# Patient Record
Sex: FEMALE | Race: WHITE | NOT HISPANIC OR LATINO | URBAN - METROPOLITAN AREA
[De-identification: names, ages, dates, MRNs, and addresses within clinical notes are randomized per-mention and may not be internally consistent; named-entity substitution may affect disease eponyms.]

---

## 2019-07-18 ENCOUNTER — OUTPATIENT (OUTPATIENT)
Dept: OUTPATIENT SERVICES | Facility: HOSPITAL | Age: 84
LOS: 1 days | End: 2019-07-18
Payer: MEDICARE

## 2019-07-18 VITALS
SYSTOLIC BLOOD PRESSURE: 171 MMHG | OXYGEN SATURATION: 96 % | HEART RATE: 79 BPM | TEMPERATURE: 98 F | RESPIRATION RATE: 16 BRPM | DIASTOLIC BLOOD PRESSURE: 75 MMHG | HEIGHT: 65 IN | WEIGHT: 212.97 LBS

## 2019-07-18 VITALS
OXYGEN SATURATION: 95 % | DIASTOLIC BLOOD PRESSURE: 71 MMHG | RESPIRATION RATE: 20 BRPM | SYSTOLIC BLOOD PRESSURE: 128 MMHG | HEART RATE: 70 BPM

## 2019-07-18 DIAGNOSIS — H25.812 COMBINED FORMS OF AGE-RELATED CATARACT, LEFT EYE: ICD-10-CM

## 2019-07-18 DIAGNOSIS — Z90.710 ACQUIRED ABSENCE OF BOTH CERVIX AND UTERUS: Chronic | ICD-10-CM

## 2019-07-18 DIAGNOSIS — Z98.890 OTHER SPECIFIED POSTPROCEDURAL STATES: Chronic | ICD-10-CM

## 2019-07-18 PROCEDURE — V2632: CPT

## 2019-07-18 PROCEDURE — 66984 XCAPSL CTRC RMVL W/O ECP: CPT | Mod: LT

## 2019-07-18 NOTE — ASU DISCHARGE PLAN (ADULT/PEDIATRIC) - PATIENT EDUCATION MATERIALS PROVIED
Intraocular lens implant(IOL), Eye shield with instructions , sunglasses and eye kit given to patient./Implant card (specify)/Other (specify)

## 2019-07-18 NOTE — ASU DISCHARGE PLAN (ADULT/PEDIATRIC) - ASU DC SPECIAL INSTRUCTIONSFT
You are permitted to remove the hard shield anytime after 5pm and discard the soft dressing and tape. Replace the hard shield and secure with a piece of tape. Your vision will be blurry in this eye today. Do not take drops in this eye unless otherwise instructed. Continue any drops as usual in the other eye. Wear the eye shield at bedtime and while napping. Bring the eye kit and all of your other eye medications to the office tomorrow. You may experience some itching or scratchiness following surgery. This is normal and is usually relieved with Tylenol which can be taken 650mg by mouth every 4-6 hours for pain. Avoid Aspirin or Motrin. If you experience persistent decrease in vision, pain, excessive bleeding , temperature above 101, persistent nausea or vomiting contact your surgeon at [phone].

## 2019-07-18 NOTE — ASU DISCHARGE PLAN (ADULT/PEDIATRIC) - DRIVING
Baby was not in room when LC arrived, parents state baby is NBN but should be coming back after feeding in the NBN, mother states pumping has been going well but is concerned she has not gotten any milk yet when she pumps, discussed supply and demand in regards to milk supply, assured her it is normal not to get milk when first beginning to pump, verified understanding of proper pump use and settings, mother denies pain when she pumps, states she has been pumping comfortably at a suction of 30%, encouraged to be sure to set suction for comfort, encouraged to decrease speed from 80-60 after 2 minutes    Plan:  Q 3 hours may attempt to breastfeed for 5-10 minutes maximum  Pump for 15 minutes  Supplement per MD orders    Mother has a Medela pump for home use, encouraged rental of a HG pump until breastfeeding/milk supply better established, pump rental information provided    Encouraged to call for assistance as needed   No...

## 2022-01-27 NOTE — ASU DISCHARGE PLAN (ADULT/PEDIATRIC) - CARE PROVIDER_API CALL
[General Appearance - Well Developed] : well developed [Normal Appearance] : normal appearance [Well Groomed] : well groomed [General Appearance - Well Nourished] : well nourished [No Deformities] : no deformities [General Appearance - In No Acute Distress] : no acute distress [Normal Conjunctiva] : the conjunctiva exhibited no abnormalities [Eyelids - No Xanthelasma] : the eyelids demonstrated no xanthelasmas [Normal Oral Mucosa] : normal oral mucosa [No Oral Pallor] : no oral pallor [No Oral Cyanosis] : no oral cyanosis [Normal Jugular Venous A Waves Present] : normal jugular venous A waves present [Normal Jugular Venous V Waves Present] : normal jugular venous V waves present [No Jugular Venous Myers A Waves] : no jugular venous myers A waves [Heart Rate And Rhythm] : heart rate and rhythm were normal [Heart Sounds] : normal S1 and S2 [Systolic grade ___/6] : A grade [unfilled]/6 systolic murmur was heard. [Respiration, Rhythm And Depth] : normal respiratory rhythm and effort [Exaggerated Use Of Accessory Muscles For Inspiration] : no accessory muscle use [Auscultation Breath Sounds / Voice Sounds] : lungs were clear to auscultation bilaterally [Bowel Sounds] : normal bowel sounds [Abdomen Soft] : soft [Abdomen Tenderness] : non-tender [Abdomen Mass (___ Cm)] : no abdominal mass palpated [Abnormal Walk] : normal gait [Gait - Sufficient For Exercise Testing] : the gait was sufficient for exercise testing [Nail Clubbing] : no clubbing of the fingernails [Cyanosis, Localized] : no localized cyanosis [Petechial Hemorrhages (___cm)] : no petechial hemorrhages [Skin Color & Pigmentation] : normal skin color and pigmentation [] : no rash [No Venous Stasis] : no venous stasis [Skin Lesions] : no skin lesions [No Skin Ulcers] : no skin ulcer [No Xanthoma] : no  xanthoma was observed [Oriented To Time, Place, And Person] : oriented to person, place, and time Tennille Bernardo)  Ophthalmology  [address], [address]  [place] [address]  Phone: [phone]  Fax: [phone]  Follow Up Time:

## 2023-01-25 ENCOUNTER — RX ONLY (RX ONLY)
Age: 88
End: 2023-01-25

## 2023-01-25 ENCOUNTER — OFFICE (OUTPATIENT)
Dept: URBAN - METROPOLITAN AREA CLINIC 109 | Facility: CLINIC | Age: 88
Setting detail: OPHTHALMOLOGY
End: 2023-01-25
Payer: MEDICARE

## 2023-01-25 DIAGNOSIS — H18.503: ICD-10-CM

## 2023-01-25 DIAGNOSIS — H40.013: ICD-10-CM

## 2023-01-25 DIAGNOSIS — H52.4: ICD-10-CM

## 2023-01-25 PROCEDURE — 92202 OPSCPY EXTND ON/MAC DRAW: CPT | Performed by: OPHTHALMOLOGY

## 2023-01-25 PROCEDURE — 92015 DETERMINE REFRACTIVE STATE: CPT | Performed by: OPHTHALMOLOGY

## 2023-01-25 PROCEDURE — 92025 CPTRIZED CORNEAL TOPOGRAPHY: CPT | Performed by: OPHTHALMOLOGY

## 2023-01-25 PROCEDURE — 92004 COMPRE OPH EXAM NEW PT 1/>: CPT | Performed by: OPHTHALMOLOGY

## 2023-01-25 ASSESSMENT — AXIALLENGTH_DERIVED
OD_AL: 23.0579
OD_AL: 25.53
OS_AL: 26.08
OS_AL: 23.316
OS_AL: 23.8
OD_AL: 23.0115

## 2023-01-25 ASSESSMENT — REFRACTION_MANIFEST
OS_ADD: +2.75
OD_CYLINDER: -3.00
OD_CYLINDER: -2.75
OD_VA1: 20/30
OS_AXIS: 95
OS_SPHERE: +2.00
OS_AXIS: 100
OD_SPHERE: +1.50
OS_CYLINDER: -3.50
OS_SPHERE: -4.50
OD_VA1: 20/20
OD_ADD: +2.75
OD_AXIS: 85
OS_VA1: 20/40
OS_CYLINDER: -3.50
OS_VA1: 20/20
OD_AXIS: 86
OD_SPHERE: -4.75
OS_ADD: +2.75
OD_ADD: +2.75

## 2023-01-25 ASSESSMENT — REFRACTION_CURRENTRX
OS_AXIS: 17
OS_ADD: +3.00
OD_OVR_VA: 20/
OD_CYLINDER: -1.75
OD_ADD: +3.00
OS_CYLINDER: -2.00
OS_OVR_VA: 20/
OD_SPHERE: -0.25
OD_AXIS: 86
OS_SPHERE: -0.75

## 2023-01-25 ASSESSMENT — PACHYMETRY
OD_CT_CORRECTION: -6
OD_CT_UM: 634
OS_CT_CORRECTION: -6
OS_CT_UM: 621

## 2023-01-25 ASSESSMENT — SPHEQUIV_DERIVED
OD_SPHEQUIV: 0
OS_SPHEQUIV: -1
OD_SPHEQUIV: -6.125
OD_SPHEQUIV: -0.125
OS_SPHEQUIV: -6.25
OS_SPHEQUIV: 0.25

## 2023-01-25 ASSESSMENT — CONFRONTATIONAL VISUAL FIELD TEST (CVF)
OS_FINDINGS: FULL
OD_FINDINGS: FULL

## 2023-01-25 ASSESSMENT — REFRACTION_AUTOREFRACTION
OS_CYLINDER: -4.00
OS_AXIS: 96
OS_SPHERE: +1.00
OD_CYLINDER: -3.25
OD_AXIS: 82
OD_SPHERE: +1.50

## 2023-01-25 ASSESSMENT — KERATOMETRY
OS_K2POWER_DIOPTERS: 44.87
OD_K2POWER_DIOPTERS: 45.87
OD_AXISANGLE_DEGREES: 167
OS_K1POWER_DIOPTERS: 43.12
OD_K1POWER_DIOPTERS: 44.37
OS_AXISANGLE_DEGREES: 173

## 2023-01-25 ASSESSMENT — VISUAL ACUITY
OS_BCVA: 20/20-1
OD_BCVA: 20/70

## 2023-01-25 ASSESSMENT — TONOMETRY
OS_IOP_MMHG: 16
OD_IOP_MMHG: 16

## 2023-01-25 ASSESSMENT — LID POSITION - PTOSIS
OS_PTOSIS: LUL 1+
OD_PTOSIS: RUL 1+

## 2023-03-06 ENCOUNTER — INPATIENT (INPATIENT)
Facility: HOSPITAL | Age: 88
LOS: 1 days | Discharge: HOME | DRG: 305 | End: 2023-03-08
Attending: INTERNAL MEDICINE | Admitting: INTERNAL MEDICINE
Payer: MEDICARE

## 2023-03-06 ENCOUNTER — TRANSCRIPTION ENCOUNTER (OUTPATIENT)
Age: 88
End: 2023-03-06

## 2023-03-06 VITALS
OXYGEN SATURATION: 97 % | HEART RATE: 93 BPM | DIASTOLIC BLOOD PRESSURE: 85 MMHG | RESPIRATION RATE: 18 BRPM | WEIGHT: 179.02 LBS | TEMPERATURE: 98 F | SYSTOLIC BLOOD PRESSURE: 149 MMHG | HEIGHT: 66 IN

## 2023-03-06 DIAGNOSIS — Z90.710 ACQUIRED ABSENCE OF BOTH CERVIX AND UTERUS: Chronic | ICD-10-CM

## 2023-03-06 DIAGNOSIS — Z98.890 OTHER SPECIFIED POSTPROCEDURAL STATES: Chronic | ICD-10-CM

## 2023-03-06 DIAGNOSIS — R03.0 ELEVATED BLOOD-PRESSURE READING, WITHOUT DIAGNOSIS OF HYPERTENSION: ICD-10-CM

## 2023-03-06 PROBLEM — Z00.00 ENCOUNTER FOR PREVENTIVE HEALTH EXAMINATION: Status: ACTIVE | Noted: 2023-03-06

## 2023-03-06 PROBLEM — E03.9 HYPOTHYROIDISM, UNSPECIFIED: Chronic | Status: ACTIVE | Noted: 2019-07-18

## 2023-03-06 PROBLEM — I48.91 UNSPECIFIED ATRIAL FIBRILLATION: Chronic | Status: ACTIVE | Noted: 2019-07-18

## 2023-03-06 LAB
ALBUMIN SERPL ELPH-MCNC: 3.8 G/DL — SIGNIFICANT CHANGE UP (ref 3.3–5)
ALP SERPL-CCNC: 73 U/L — SIGNIFICANT CHANGE UP (ref 40–120)
ALT FLD-CCNC: 18 U/L — SIGNIFICANT CHANGE UP (ref 12–78)
ANION GAP SERPL CALC-SCNC: 6 MMOL/L — SIGNIFICANT CHANGE UP (ref 5–17)
APTT BLD: 48.3 SEC — HIGH (ref 27.5–35.5)
AST SERPL-CCNC: 18 U/L — SIGNIFICANT CHANGE UP (ref 15–37)
BASOPHILS # BLD AUTO: 0.1 K/UL — SIGNIFICANT CHANGE UP (ref 0–0.2)
BASOPHILS NFR BLD AUTO: 1.2 % — SIGNIFICANT CHANGE UP (ref 0–2)
BILIRUB SERPL-MCNC: 1.1 MG/DL — SIGNIFICANT CHANGE UP (ref 0.2–1.2)
BUN SERPL-MCNC: 12 MG/DL — SIGNIFICANT CHANGE UP (ref 7–23)
CALCIUM SERPL-MCNC: 9.3 MG/DL — SIGNIFICANT CHANGE UP (ref 8.5–10.1)
CHLORIDE SERPL-SCNC: 105 MMOL/L — SIGNIFICANT CHANGE UP (ref 96–108)
CK SERPL-CCNC: 44 U/L — SIGNIFICANT CHANGE UP (ref 26–192)
CO2 SERPL-SCNC: 25 MMOL/L — SIGNIFICANT CHANGE UP (ref 22–31)
CREAT SERPL-MCNC: 0.89 MG/DL — SIGNIFICANT CHANGE UP (ref 0.5–1.3)
EGFR: 63 ML/MIN/1.73M2 — SIGNIFICANT CHANGE UP
EOSINOPHIL # BLD AUTO: 0.58 K/UL — HIGH (ref 0–0.5)
EOSINOPHIL NFR BLD AUTO: 6.9 % — HIGH (ref 0–6)
FLUAV AG NPH QL: SIGNIFICANT CHANGE UP
FLUBV AG NPH QL: SIGNIFICANT CHANGE UP
GLUCOSE SERPL-MCNC: 114 MG/DL — HIGH (ref 70–99)
HCT VFR BLD CALC: 52.5 % — HIGH (ref 34.5–45)
HGB BLD-MCNC: 16.8 G/DL — HIGH (ref 11.5–15.5)
IMM GRANULOCYTES NFR BLD AUTO: 0.2 % — SIGNIFICANT CHANGE UP (ref 0–0.9)
INR BLD: 2.95 RATIO — HIGH (ref 0.88–1.16)
LYMPHOCYTES # BLD AUTO: 1.15 K/UL — SIGNIFICANT CHANGE UP (ref 1–3.3)
LYMPHOCYTES # BLD AUTO: 13.7 % — SIGNIFICANT CHANGE UP (ref 13–44)
MCHC RBC-ENTMCNC: 23.7 PG — LOW (ref 27–34)
MCHC RBC-ENTMCNC: 32 GM/DL — SIGNIFICANT CHANGE UP (ref 32–36)
MCV RBC AUTO: 73.9 FL — LOW (ref 80–100)
MONOCYTES # BLD AUTO: 0.82 K/UL — SIGNIFICANT CHANGE UP (ref 0–0.9)
MONOCYTES NFR BLD AUTO: 9.8 % — SIGNIFICANT CHANGE UP (ref 2–14)
NEUTROPHILS # BLD AUTO: 5.72 K/UL — SIGNIFICANT CHANGE UP (ref 1.8–7.4)
NEUTROPHILS NFR BLD AUTO: 68.2 % — SIGNIFICANT CHANGE UP (ref 43–77)
NRBC # BLD: 0 /100 WBCS — SIGNIFICANT CHANGE UP (ref 0–0)
PLATELET # BLD AUTO: 654 K/UL — HIGH (ref 150–400)
POTASSIUM SERPL-MCNC: 4 MMOL/L — SIGNIFICANT CHANGE UP (ref 3.5–5.3)
POTASSIUM SERPL-SCNC: 4 MMOL/L — SIGNIFICANT CHANGE UP (ref 3.5–5.3)
PROT SERPL-MCNC: 6.8 G/DL — SIGNIFICANT CHANGE UP (ref 6–8.3)
PROTHROM AB SERPL-ACNC: 34.9 SEC — HIGH (ref 10.5–13.4)
RBC # BLD: 7.1 M/UL — HIGH (ref 3.8–5.2)
RBC # FLD: 19.5 % — HIGH (ref 10.3–14.5)
RSV RNA NPH QL NAA+NON-PROBE: SIGNIFICANT CHANGE UP
SARS-COV-2 RNA SPEC QL NAA+PROBE: SIGNIFICANT CHANGE UP
SODIUM SERPL-SCNC: 136 MMOL/L — SIGNIFICANT CHANGE UP (ref 135–145)
TROPONIN I, HIGH SENSITIVITY RESULT: 12.3 NG/L — SIGNIFICANT CHANGE UP
TROPONIN I, HIGH SENSITIVITY RESULT: 9.8 NG/L — SIGNIFICANT CHANGE UP
WBC # BLD: 8.39 K/UL — SIGNIFICANT CHANGE UP (ref 3.8–10.5)
WBC # FLD AUTO: 8.39 K/UL — SIGNIFICANT CHANGE UP (ref 3.8–10.5)

## 2023-03-06 PROCEDURE — 93010 ELECTROCARDIOGRAM REPORT: CPT

## 2023-03-06 PROCEDURE — 70450 CT HEAD/BRAIN W/O DYE: CPT | Mod: 26,MA

## 2023-03-06 PROCEDURE — 99285 EMERGENCY DEPT VISIT HI MDM: CPT

## 2023-03-06 PROCEDURE — 71045 X-RAY EXAM CHEST 1 VIEW: CPT | Mod: 26

## 2023-03-06 RX ORDER — WARFARIN SODIUM 2.5 MG/1
2 TABLET ORAL ONCE
Refills: 0 | Status: COMPLETED | OUTPATIENT
Start: 2023-03-06 | End: 2023-03-06

## 2023-03-06 RX ORDER — LEVOTHYROXINE SODIUM 125 MCG
100 TABLET ORAL DAILY
Refills: 0 | Status: DISCONTINUED | OUTPATIENT
Start: 2023-03-06 | End: 2023-03-08

## 2023-03-06 RX ORDER — WARFARIN SODIUM 2.5 MG/1
1 TABLET ORAL
Qty: 0 | Refills: 0 | DISCHARGE

## 2023-03-06 RX ORDER — VERAPAMIL HCL 240 MG
120 CAPSULE, EXTENDED RELEASE PELLETS 24 HR ORAL DAILY
Refills: 0 | Status: DISCONTINUED | OUTPATIENT
Start: 2023-03-06 | End: 2023-03-06

## 2023-03-06 RX ORDER — LEVOTHYROXINE SODIUM 125 MCG
1 TABLET ORAL
Qty: 0 | Refills: 0 | DISCHARGE

## 2023-03-06 RX ORDER — LOSARTAN POTASSIUM 100 MG/1
100 TABLET, FILM COATED ORAL DAILY
Refills: 0 | Status: DISCONTINUED | OUTPATIENT
Start: 2023-03-06 | End: 2023-03-07

## 2023-03-06 RX ORDER — VERAPAMIL HCL 240 MG
120 CAPSULE, EXTENDED RELEASE PELLETS 24 HR ORAL DAILY
Refills: 0 | Status: DISCONTINUED | OUTPATIENT
Start: 2023-03-06 | End: 2023-03-08

## 2023-03-06 RX ADMIN — WARFARIN SODIUM 2 MILLIGRAM(S): 2.5 TABLET ORAL at 22:57

## 2023-03-06 NOTE — H&P ADULT - NSHPREVIEWOFSYSTEMS_GEN_ALL_CORE
REVIEW OF SYSTEMS:    CONSTITUTIONAL: No weakness, fevers or chills  EYES/ENT: No visual changes;  No vertigo or throat pain   NECK: No pain or stiffness  RESPIRATORY: No cough, wheezing, hemoptysis; No shortness of breath  CARDIOVASCULAR: chest pain   GASTROINTESTINAL: No abdominal or epigastric pain. No nausea, vomiting, or hematemesis; No diarrhea or constipation. No melena or hematochezia.  GENITOURINARY: No dysuria, frequency or hematuria  NEUROLOGICAL: No numbness or weakness  SKIN: No itching, burning, rashes, or lesions   All other review of systems is negative unless indicated above.

## 2023-03-06 NOTE — PATIENT PROFILE ADULT - COMPLETE THE FOLLOWING IF THE PATIENT REFUSES THE INFLUENZA VACCINE:
=================[id number]=================  Taken 11:33:15 pm 04/27/22 Oper.  14  Dlvrd 11:35:56 pm 04/27/22 To CMD          ALPHA-NUMERIC PAGE          Terminal No. : 30        Pager Number : Meseretjoellen Velazquez    Message : [address] MARYANN VINCENT [phone]  [id number] SELIN VINCENT [birth year]   - JUST MISSED YOUR CALL PLEASE CA  LL  AGAIN Risks/benefits discussed with patient/surrogate

## 2023-03-06 NOTE — ED ADULT TRIAGE NOTE - CHIEF COMPLAINT QUOTE
Pt arrived to ED c/o chest pressure today.   Pt reports a recent hx of headaches/dizziness and increased weakness.   Was seen by PMD over the past week and noted to have /100, pt medications were adjusted at that time and per Dr Cha pt was experiencings TIA symptoms at that time.    Pt appears weak.  OAKES with equal strength +PERLSA pupils 2mm, no facial asymmetry.   Pt requires assistance to ambulate.  No acute neuro deficits.   Pt states the chest pressure was more significant earlier, mid sternal and has now imprved. BN

## 2023-03-06 NOTE — H&P ADULT - HISTORY OF PRESENT ILLNESS
87-year-old female with past medical history of A-fib on Coumadin hypertension coming in for elevated blood pressure for the past 2 weeks 200s over 100.  Patient states she was at [facility] 2 weeks ago admitted and had multiple blood pressure adjustments currently on hydralazine losartan and verapamil.  Patient states has been having telehealth follow-up with Dr. Cha he still having elevated blood pressure.  Patient states today around 1130 had chest pressure intermittent now resolved so called Dr. Cha advised to come to emergency room.  Patient states last night she also had right arm numbness and tingling from the elbow down now resolved.  Patient also has been having intermittent headaches denies any blurry vision numbness tingling weakness.  Patient states at times she wakes up during the night because hears pounding heartbeat in her ears.  She denies any shortness of breath leg swelling recent travels.

## 2023-03-06 NOTE — ED PROVIDER NOTE - CLINICAL SUMMARY MEDICAL DECISION MAKING FREE TEXT BOX
I, Mak Moser MD, have seen and examined the patient on the date of service.  I agree with the MIMI's assessment as written, with exceptions or additions as noted below or in a separate note.  Patient with a past medical history of afib on warfarin, hypertension who follows with Dr. Cha is presenting with concern for elevated blood pressure and headaches.  Has been trying to have it managed at home but then yesterday started developing chest pain that was described as pressure-like so came to ED for evaluation.  She has no focal findings on neurologic exam.  Assessment and plan: We will check EKG and troponin for rule out ischemia.  No vision changes to suggest pres, however will check CT head in the setting of her hypertension.  We will check lab work for rule out renal injury as well.  Plan for labs, imaging, cardiology consult.  Anticipate likely admission.

## 2023-03-06 NOTE — PATIENT PROFILE ADULT - VISION (WITH CORRECTIVE LENSES IF THE PATIENT USUALLY WEARS THEM):
pt wear glasses at bedside./Normal vision: sees adequately in most situations; can see medication labels, newsprint

## 2023-03-06 NOTE — ED PROVIDER NOTE - CARE PLAN
Principal Discharge DX:	Elevated blood pressure reading  Secondary Diagnosis:	Atypical chest pain   1

## 2023-03-06 NOTE — ED ADULT NURSE NOTE - CHIEF COMPLAINT QUOTE
94
Pt arrived to ED c/o chest pressure today.   Pt reports a recent hx of headaches/dizziness and increased weakness.   Was seen by PMD over the past week and noted to have /100, pt medications were adjusted at that time and per Dr Cha pt was experiencings TIA symptoms at that time.    Pt appears weak.  OAKES with equal strength +PERLSA pupils 2mm, no facial asymmetry.   Pt requires assistance to ambulate.  No acute neuro deficits.   Pt states the chest pressure was more significant earlier, mid sternal and has now imprved. BN

## 2023-03-06 NOTE — ED PROVIDER NOTE - NS ED ATTENDING STATEMENT MOD
This was a shared visit with the MIMI. I reviewed and verified the documentation and independently performed the documented:

## 2023-03-06 NOTE — ED ADULT NURSE NOTE - NSIMPLEMENTINTERV_GEN_ALL_ED
Implemented All Fall with Harm Risk Interventions:  [place] to call system. Call bell, personal items and telephone within reach. Instruct patient to call for assistance. Room bathroom lighting operational. Non-slip footwear when patient is off stretcher. Physically safe environment: no spills, clutter or unnecessary equipment. Stretcher in lowest position, wheels locked, appropriate side rails in place. Provide visual cue, wrist band, yellow gown, etc. Monitor gait and stability. Monitor for mental status changes and reorient to person, place, and time. Review medications for side effects contributing to fall risk. Reinforce activity limits and safety measures with patient and family. Provide visual clues: red socks.

## 2023-03-06 NOTE — H&P ADULT - ASSESSMENT
87-year-old female with past medical history of A-fib on Coumadin hypertension coming in for elevated blood pressure for the past 2 weeks 200s over 100.  Patient states she was at [facility] 2 weeks ago admitted and had multiple blood pressure adjustments currently on hydralazine losartan and verapamil.  Patient states has been having telehealth follow-up with Dr. Cha he still having elevated blood pressure.  Patient states today around 1130 had chest pressure intermittent now resolved so called Dr. Cha advised to come to emergency room.  Patient states last night she also had right arm numbness and tingling from the elbow down now resolved.  Patient also has been having intermittent headaches denies any blurry vision numbness tingling weakness.  Patient states at times she wakes up during the night because hears pounding heartbeat in her ears.  She denies any shortness of breath leg swelling recent travels. 87-year-old female with past medical history of A-fib on Coumadin hypertension coming in for elevated blood pressure for the past 2 weeks 200s over 100.  Patient states she was at [facility] 2 weeks ago admitted and had multiple blood pressure adjustments currently on hydralazine losartan and verapamil.  Patient states has been having telehealth follow-up with Dr. Cha he still having elevated blood pressure.  Patient states today around 1130 had chest pressure intermittent now resolved so called Dr. Cha advised to come to emergency room.  Patient states last night she also had right arm numbness and tingling from the elbow down now resolved.  Patient also has been having intermittent headaches denies any blurry vision numbness tingling weakness.  Patient states at times she wakes up during the night because hears pounding heartbeat in her ears.  She denies any shortness of breath leg swelling recent travels.    1 Chest pain  - ro ACS  - telemonitor  - check CE  - ischemia meeks as per cards    2 Accelerated hypertension  - BP above 200 at home  - now stable  - cw home meds for now  - DASH diet    3 Afib  - check INR and dose coumadin daily  - cw verapamil

## 2023-03-06 NOTE — H&P ADULT - NSICDXPASTSURGICALHX_GEN_ALL_CORE_FT
PAST SURGICAL HISTORY:  History of arthroscopy left knee    History of colonoscopy     Status post hysterectomy 1972

## 2023-03-06 NOTE — PATIENT PROFILE ADULT - FALL HARM RISK - HARM RISK INTERVENTIONS

## 2023-03-06 NOTE — ED ADULT NURSE NOTE - OBJECTIVE STATEMENT
Pt arrived to ED with daughter c/o chest pressure today. Pt reports a recent hx of headaches/dizziness and increased weakness. Was seen by PMD over the past week and noted to have /100, pt medications were adjusted at that time and per Dr Cha pt was experiencing TIA symptoms at that time. Pt appears weak. OAKES with equal strength +PERLAA pupils 2mm, no facial asymmetry.  Pt requires assistance to ambulate. No acute neuro deficits. Pt states the chest pressure was more significant earlier, mid sternal and has now improved. Pt placed on monitor, safety maintained, call bell within reach. Nursing care ongoing.

## 2023-03-06 NOTE — ED PROVIDER NOTE - OBJECTIVE STATEMENT
Patient is a 87-year-old female with past medical history of A-fib on Coumadin hypertension coming in for elevated blood pressure for the past 2 weeks 200s over 100.  Patient states she was at [facility] 2 weeks ago admitted and had multiple blood pressure adjustments currently on hydralazine losartan and verapamil.  Patient states has been having telehealth follow-up with Dr. Cha he still having elevated blood pressure.  Patient states today around 1130 had chest pressure intermittent now resolved so called Dr. Cha advised to come to emergency room.  Patient states last night she also had right arm numbness and tingling from the elbow down now resolved.  Patient also has been having intermittent headaches denies any blurry vision numbness tingling weakness.  Patient states at times she wakes up during the night because hears pounding heartbeat in her ears.  She denies any shortness of breath leg swelling recent travels.

## 2023-03-06 NOTE — H&P ADULT - NSHPLABSRESULTS_GEN_ALL_CORE
Lab Results:  CBC  CBC Full  -  ( 06 Mar 2023 15:00 )  WBC Count : 8.39 K/uL  RBC Count : 7.10 M/uL  Hemoglobin : 16.8 g/dL  Hematocrit : 52.5 %  Platelet Count - Automated : 654 K/uL  Mean Cell Volume : 73.9 fl  Mean Cell Hemoglobin : 23.7 pg  Mean Cell Hemoglobin Concentration : 32.0 gm/dL  Auto Neutrophil # : 5.72 K/uL  Auto Lymphocyte # : 1.15 K/uL  Auto Monocyte # : 0.82 K/uL  Auto Eosinophil # : 0.58 K/uL  Auto Basophil # : 0.10 K/uL  Auto Neutrophil % : 68.2 %  Auto Lymphocyte % : 13.7 %  Auto Monocyte % : 9.8 %  Auto Eosinophil % : 6.9 %  Auto Basophil % : 1.2 %    .		Differential:	[] Automated		[] Manual  Chemistry                        16.8   8.39  )-----------( 654      ( 06 Mar 2023 15:00 )             52.5     03-06    136  |  105  |  12  ----------------------------<  114<H>  4.0   |  25  |  0.89    Ca    9.3      06 Mar 2023 15:40    TPro  6.8  /  Alb  3.8  /  TBili  1.1  /  DBili  x   /  AST  18  /  ALT  18  /  AlkPhos  73  03-06    LIVER FUNCTIONS - ( 06 Mar 2023 15:40 )  Alb: 3.8 g/dL / Pro: 6.8 g/dL / ALK PHOS: 73 U/L / ALT: 18 U/L / AST: 18 U/L / GGT: x           PT/INR - ( 06 Mar 2023 15:00 )   PT: 34.9 sec;   INR: 2.95 ratio         PTT - ( 06 Mar 2023 15:00 )  PTT:48.3 sec          MICROBIOLOGY/CULTURES:      RADIOLOGY RESULTS: reviewed

## 2023-03-07 LAB
ALBUMIN SERPL ELPH-MCNC: 3.3 G/DL — SIGNIFICANT CHANGE UP (ref 3.3–5)
ALP SERPL-CCNC: 73 U/L — SIGNIFICANT CHANGE UP (ref 40–120)
ALT FLD-CCNC: 18 U/L — SIGNIFICANT CHANGE UP (ref 12–78)
ANION GAP SERPL CALC-SCNC: 7 MMOL/L — SIGNIFICANT CHANGE UP (ref 5–17)
APTT BLD: 49.9 SEC — HIGH (ref 27.5–35.5)
AST SERPL-CCNC: 15 U/L — SIGNIFICANT CHANGE UP (ref 15–37)
BILIRUB SERPL-MCNC: 1.3 MG/DL — HIGH (ref 0.2–1.2)
BUN SERPL-MCNC: 12 MG/DL — SIGNIFICANT CHANGE UP (ref 7–23)
CALCIUM SERPL-MCNC: 9 MG/DL — SIGNIFICANT CHANGE UP (ref 8.5–10.1)
CHLORIDE SERPL-SCNC: 107 MMOL/L — SIGNIFICANT CHANGE UP (ref 96–108)
CO2 SERPL-SCNC: 24 MMOL/L — SIGNIFICANT CHANGE UP (ref 22–31)
CREAT SERPL-MCNC: 0.79 MG/DL — SIGNIFICANT CHANGE UP (ref 0.5–1.3)
EGFR: 72 ML/MIN/1.73M2 — SIGNIFICANT CHANGE UP
GLUCOSE SERPL-MCNC: 72 MG/DL — SIGNIFICANT CHANGE UP (ref 70–99)
HCT VFR BLD CALC: 50.6 % — HIGH (ref 34.5–45)
HGB BLD-MCNC: 16.1 G/DL — HIGH (ref 11.5–15.5)
INR BLD: 2.56 RATIO — HIGH (ref 0.88–1.16)
MCHC RBC-ENTMCNC: 23.8 PG — LOW (ref 27–34)
MCHC RBC-ENTMCNC: 31.8 GM/DL — LOW (ref 32–36)
MCV RBC AUTO: 74.9 FL — LOW (ref 80–100)
NRBC # BLD: 0 /100 WBCS — SIGNIFICANT CHANGE UP (ref 0–0)
PLATELET # BLD AUTO: 621 K/UL — HIGH (ref 150–400)
POTASSIUM SERPL-MCNC: 3.9 MMOL/L — SIGNIFICANT CHANGE UP (ref 3.5–5.3)
POTASSIUM SERPL-SCNC: 3.9 MMOL/L — SIGNIFICANT CHANGE UP (ref 3.5–5.3)
PROT SERPL-MCNC: 6.4 G/DL — SIGNIFICANT CHANGE UP (ref 6–8.3)
PROTHROM AB SERPL-ACNC: 30.2 SEC — HIGH (ref 10.5–13.4)
RBC # BLD: 6.76 M/UL — HIGH (ref 3.8–5.2)
RBC # FLD: 19.5 % — HIGH (ref 10.3–14.5)
SODIUM SERPL-SCNC: 138 MMOL/L — SIGNIFICANT CHANGE UP (ref 135–145)
WBC # BLD: 8.5 K/UL — SIGNIFICANT CHANGE UP (ref 3.8–10.5)
WBC # FLD AUTO: 8.5 K/UL — SIGNIFICANT CHANGE UP (ref 3.8–10.5)

## 2023-03-07 RX ORDER — VALSARTAN 80 MG/1
320 TABLET ORAL DAILY
Refills: 0 | Status: DISCONTINUED | OUTPATIENT
Start: 2023-03-07 | End: 2023-03-08

## 2023-03-07 RX ORDER — LOSARTAN POTASSIUM 100 MG/1
100 TABLET, FILM COATED ORAL ONCE
Refills: 0 | Status: COMPLETED | OUTPATIENT
Start: 2023-03-07 | End: 2023-03-07

## 2023-03-07 RX ORDER — ACETAMINOPHEN 500 MG
650 TABLET ORAL ONCE
Refills: 0 | Status: COMPLETED | OUTPATIENT
Start: 2023-03-07 | End: 2023-03-07

## 2023-03-07 RX ORDER — SPIRONOLACTONE 25 MG/1
25 TABLET, FILM COATED ORAL DAILY
Refills: 0 | Status: DISCONTINUED | OUTPATIENT
Start: 2023-03-07 | End: 2023-03-08

## 2023-03-07 RX ORDER — WARFARIN SODIUM 2.5 MG/1
2 TABLET ORAL ONCE
Refills: 0 | Status: COMPLETED | OUTPATIENT
Start: 2023-03-07 | End: 2023-03-07

## 2023-03-07 RX ADMIN — Medication 100 MICROGRAM(S): at 06:45

## 2023-03-07 RX ADMIN — SPIRONOLACTONE 25 MILLIGRAM(S): 25 TABLET, FILM COATED ORAL at 11:28

## 2023-03-07 RX ADMIN — Medication 120 MILLIGRAM(S): at 06:45

## 2023-03-07 RX ADMIN — VALSARTAN 320 MILLIGRAM(S): 80 TABLET ORAL at 06:57

## 2023-03-07 RX ADMIN — Medication 650 MILLIGRAM(S): at 00:49

## 2023-03-07 RX ADMIN — LOSARTAN POTASSIUM 100 MILLIGRAM(S): 100 TABLET, FILM COATED ORAL at 00:48

## 2023-03-07 RX ADMIN — WARFARIN SODIUM 2 MILLIGRAM(S): 2.5 TABLET ORAL at 21:33

## 2023-03-07 RX ADMIN — Medication 650 MILLIGRAM(S): at 01:49

## 2023-03-07 NOTE — PATIENT CHOICE NOTE. - NSPTCHOICESTATE_GEN_ALL_CORE

## 2023-03-07 NOTE — CARE COORDINATION ASSESSMENT. - NSCAREPROVIDERS_GEN_ALL_CORE_FT
CARE PROVIDERS:  Accepting Physician: Delmy Morris  Administration: Jane Winkler  Admitting: Delmy Morris  Attending: Delmy Morris  Case Management: Nazia Vázquez  Consultant: Simi Maher  Covering Team: Nadeem Gonzalez ED ACP: tSeven Ingram  ED Attending: Mak Moser ED Nurse: Hannah Mensah  Nurse: Laura Tenorio  Nurse: Charley Fink  Nurse: Mee Villalobos  Outpatient Provider: Davion Galeano  Outpatient Provider: Mesha Cha  Override: Charley Fink  Override: Lars Dong  Override: Dean Flood  PCA/Nursing Assistant: Radha Michaels  Registered Dietitian: Alix Killian

## 2023-03-07 NOTE — CHART NOTE - NSCHARTNOTEFT_GEN_A_CORE
Pt today contacted office with 2 episodes of chest discomfort and intractable HTN for past week.  She has:  TIA 5/2021 with left 4/5 finger paresthesia and plegia.   Chronic AF for many years, rate controlled on verapamil.  On warfarin.  Pt refused NOAC, felt that in 2021 she had TIA when she was switched from warfarin to NOAC.  Polycythemia  Dyslipidemia      Recent symptom started 2/22/2023 with "not feeling well" at home and BP found to be in the 160s. She felt left arm numbness and went to [facility] ER where systolic BP was up to slightly over 200.      Workup at [facility]:    CXR with subtle opacity at Left CP angle.    Carotid with <50% stenosis bilaterally.  Antegrade vertebral flow.    CT Head with no acute intracranial pathology.  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     She was discharged on higher dose of losartan at 100 mg, and new hydralazine 25 mg BID>      She was seen in office on 2/27/2023 with good BP at 126/70, checked twice.    She presented to [facility] ER again with SBP at 220.  She was instructed to take additional hydralazine 25 mg as needed for BP spikes.    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  Recommend:   1. increased hydralazine to 50 mg Q6h with hold parameters.  Titrate up as needed.  2. serial trops.   3. If needed, can change verapamil to carvedilol for BP control.    Above d/w pt                         On March 2nd, BP increased to 162/97.  Over weekend, continued to rise to 203/110.  Hydralazine increased to 50 mg BID.    Today pt called to report 2 bouts of chest discomfort of about 10 mins duration, moderate intensity.
called by RN for BP 160s + headache. given tylenol. Antihypertensives ordered due in the AM. will give losartan 100 mg PO x 1 now. CTH negative on admission.

## 2023-03-07 NOTE — CONSULT NOTE ADULT - ASSESSMENT
87F with HTN, AF on coumadin, recent hospitalization at [facility] for HTN s/p medication changes a/w chest discomfort and recent LEFT arm weakness/nerve pain.  Strokes are noted on CT head    Suggest:    1.  HTN urgency  - medication adjustments  - neuro Eval, consider Brain MR    2.  Afib rate controlled  - c/w coumadin INR 2 -3    3. Chest discomfort  Trop negative x 2 ruled out for MI    4. Will follow

## 2023-03-07 NOTE — CARE COORDINATION ASSESSMENT. - OTHER PERTINENT DISCHARGE PLANNING INFORMATION:
CM met with the patient and her son Be at the bedside and explained role of CM and transition care planning. Patient verbalized understanding. Patient lives alone in a private home. 2 steps to enter. Ambulates with a Rollator outside the home. Owns a RW and Rollator PTA. Independent with medication management. Patient had a visiting nurse with [facility] PTA and would like the service resumed. CM provided direct contact/resource folder and remains available.

## 2023-03-07 NOTE — PHYSICAL THERAPY INITIAL EVALUATION ADULT - ADDITIONAL COMMENTS
Patient lives in private home, + KIMO then resides on main floor.  Patient was independent in all ADLs and ambulates independently with RW

## 2023-03-07 NOTE — CONSULT NOTE ADULT - SUBJECTIVE AND OBJECTIVE BOX
[facility] Cardiology Consult - Waleska Felipe Cha [phone]    CHIEF COMPLAINT: Patient is a 87y old  Female who presents with a chief complaint of elevated BP (06 Mar 2023 17:56)      HPI:  87-year-old female with past medical history of A-fib on Coumadin hypertension coming in for elevated blood pressure for the past 2 weeks 200s over 100.  Patient states she was at [facility] 2 weeks ago admitted and had multiple blood pressure adjustments currently on hydralazine losartan and verapamil.  Patient states has been having telehealth follow-up with Dr. Cha he still having elevated blood pressure.  Patient states today around 1130 had chest pressure intermittent now resolved so called Dr. Cha advised to come to emergency room.  Patient states last night she also had right arm numbness and tingling from the elbow down now resolved.  Patient also has been having intermittent headaches denies any blurry vision numbness tingling weakness.  Patient states at times she wakes up during the night because hears pounding heartbeat in her ears.  She denies any shortness of breath leg swelling recent travels. (06 Mar 2023 17:56)      PAST MEDICAL & SURGICAL HISTORY:  Atrial fibrillation      Hypothyroid      Status post hysterectomy  1972      History of arthroscopy  left knee      History of colonoscopy          SOCIAL HISTORY: Alochol: Denied  Smoking: Nonsmoker  Drug Use: Denied  Marital Status:         FAMILY HISTORY: FAMILY HISTORY:      MEDICATIONS  (STANDING):  levothyroxine 100 MICROGram(s) Oral daily  losartan 100 milliGRAM(s) Oral daily  verapamil  milliGRAM(s) Oral daily    MEDICATIONS  (PRN):      Allergies    Depakote (Hepatotoxicity)  iodine (Other)  sulfa drugs (Seizure)  Topamax (Other)    Intolerances        REVIEW OF SYSTEMS:  CONSTITUTIONAL: No weakness, fevers or chills  EYES/ENT: No visual changes;  No vertigo or throat pain   NECK: No pain or stiffness  RESPIRATORY: No cough, wheezing, hemoptysis; No shortness of breath  CARDIOVASCULAR: No chest pain or palpitations  GASTROINTESTINAL: No abdominal pain. No nausea, vomiting, or hematemesis; No diarrhea or constipation. No melena or hematochezia.  GENITOURINARY: No dysuria, frequency or hematuria  NEUROLOGICAL: No numbness or weakness  SKIN: No itching or rash  All other review of systems is negative unless indicated above    VITAL SIGNS:   Vital Signs Last 24 Hrs  T(C): 36.7 (07 Mar 2023 04:20), Max: 37.2 (06 Mar 2023 21:30)  T(F): 98 (07 Mar 2023 04:20), Max: 98.9 (06 Mar 2023 21:30)  HR: 82 (07 Mar 2023 04:20) (75 - 93)  BP: 152/88 (07 Mar 2023 04:20) (142/84 - 168/79)  BP(mean): 104 (06 Mar 2023 22:51) (104 - 104)  RR: 18 (07 Mar 2023 04:20) (18 - 20)  SpO2: 95% (07 Mar 2023 04:20) (95% - 98%)    Parameters below as of 07 Mar 2023 04:20  Patient On (Oxygen Delivery Method): room air        I&O's Summary      PHYSICAL EXAM:  Constitutional: Awake in NAD  HEENT:  BOOGIE, EOMI  Neck: No JVD  Pulmonary: CTA B/L No R/R/W  Cardiovascular: PMI not palpable non-displaced Regular S1 and S2, no murmurs  Gastrointestinal: Bowel Sounds present, soft, nontender.   Extremities: Trace peripheral edema.   Neuro: No gross focal deficits    LABS: All Labs Reviewed:                        16.8   8.39  )-----------( 654      ( 06 Mar 2023 15:00 )             52.5     06 Mar 2023 15:40    136    |  105    |  12     ----------------------------<  114    4.0     |  25     |  0.89     Ca    9.3        06 Mar 2023 15:40    TPro  6.8    /  Alb  3.8    /  TBili  1.1    /  DBili  x      /  AST  18     /  ALT  18     /  AlkPhos  73     06 Mar 2023 15:40    PT/INR - ( 06 Mar 2023 15:00 )   PT: 34.9 sec;   INR: 2.95 ratio         PTT - ( 06 Mar 2023 15:00 )  PTT:48.3 sec  CARDIAC MARKERS ( 06 Mar 2023 18:39 )  x     / x     / 44 U/L / x     / x          Blood Culture:         RADIOLOGY/EKG:    < from: CT Head No Cont (03.06.23 @ 15:28) >    FINDINGS:    HEMISPHERES:  There is mild generalized volume loss and involutional   change commensurate with age. In conjunction there are mild microvascular   ischemic changes. No acute infarct, hemorrhage, or mass lesion noted.  VENTRICLES:  Midline with very mild ex vacuo enlargement  POSTERIOR FOSSA:  Small infarcts and mild chronic ischemic changes are   noted in thecerebellum. No acute posterior fossa abnormality suggested  EXTRACEREBRAL SPACES:  No subdural or epidural collections are noted.  SKULL BASE AND CALVARIUM:  Appears intact.  No fracture or destructive   lesion is identified.  SINUSES AND MASTOIDS:  Mild left mastoid cell partial opacification   noted. Sinuses are clear  MISCELLANEOUS:  No orbital or suprasellar abnormality noted.    IMPRESSION:    1)  mild involutional changes and volume loss. Scattered chronic ischemic   changes in both hemispheres and within the cerebellum.. No acute infarct,   hemorrhage, or space-occupying lesion identified..  2)  low level inflammatory changes left mastoid cells. Clear sinuses.    --- End of Report ---    < end of copied text >  
paresthesia suspect possible CR   headaches possible form HTN neuro exam non focal

## 2023-03-07 NOTE — CARE COORDINATION ASSESSMENT. - NSPASTMEDSURGHISTORY_GEN_ALL_CORE_FT
PAST MEDICAL & SURGICAL HISTORY:  Hypothyroid      Atrial fibrillation      History of colonoscopy      History of arthroscopy  left knee      Status post hysterectomy  1972

## 2023-03-08 ENCOUNTER — TRANSCRIPTION ENCOUNTER (OUTPATIENT)
Age: 88
End: 2023-03-08

## 2023-03-08 VITALS
TEMPERATURE: 98 F | OXYGEN SATURATION: 94 % | SYSTOLIC BLOOD PRESSURE: 136 MMHG | DIASTOLIC BLOOD PRESSURE: 79 MMHG | HEART RATE: 95 BPM | RESPIRATION RATE: 18 BRPM

## 2023-03-08 LAB
ALBUMIN SERPL ELPH-MCNC: 3.2 G/DL — LOW (ref 3.3–5)
ALP SERPL-CCNC: 67 U/L — SIGNIFICANT CHANGE UP (ref 40–120)
ALT FLD-CCNC: 17 U/L — SIGNIFICANT CHANGE UP (ref 12–78)
ANION GAP SERPL CALC-SCNC: 6 MMOL/L — SIGNIFICANT CHANGE UP (ref 5–17)
AST SERPL-CCNC: 15 U/L — SIGNIFICANT CHANGE UP (ref 15–37)
BILIRUB SERPL-MCNC: 0.9 MG/DL — SIGNIFICANT CHANGE UP (ref 0.2–1.2)
BUN SERPL-MCNC: 15 MG/DL — SIGNIFICANT CHANGE UP (ref 7–23)
CALCIUM SERPL-MCNC: 8.6 MG/DL — SIGNIFICANT CHANGE UP (ref 8.5–10.1)
CHLORIDE SERPL-SCNC: 106 MMOL/L — SIGNIFICANT CHANGE UP (ref 96–108)
CO2 SERPL-SCNC: 24 MMOL/L — SIGNIFICANT CHANGE UP (ref 22–31)
CREAT SERPL-MCNC: 0.78 MG/DL — SIGNIFICANT CHANGE UP (ref 0.5–1.3)
EGFR: 73 ML/MIN/1.73M2 — SIGNIFICANT CHANGE UP
GLUCOSE SERPL-MCNC: 88 MG/DL — SIGNIFICANT CHANGE UP (ref 70–99)
HCT VFR BLD CALC: 49.2 % — HIGH (ref 34.5–45)
HGB BLD-MCNC: 15.9 G/DL — HIGH (ref 11.5–15.5)
INR BLD: 2.44 RATIO — HIGH (ref 0.88–1.16)
MCHC RBC-ENTMCNC: 24.1 PG — LOW (ref 27–34)
MCHC RBC-ENTMCNC: 32.3 GM/DL — SIGNIFICANT CHANGE UP (ref 32–36)
MCV RBC AUTO: 74.4 FL — LOW (ref 80–100)
NRBC # BLD: 0 /100 WBCS — SIGNIFICANT CHANGE UP (ref 0–0)
PLATELET # BLD AUTO: 548 K/UL — HIGH (ref 150–400)
POTASSIUM SERPL-MCNC: 4 MMOL/L — SIGNIFICANT CHANGE UP (ref 3.5–5.3)
POTASSIUM SERPL-SCNC: 4 MMOL/L — SIGNIFICANT CHANGE UP (ref 3.5–5.3)
PROT SERPL-MCNC: 6.2 G/DL — SIGNIFICANT CHANGE UP (ref 6–8.3)
PROTHROM AB SERPL-ACNC: 28.8 SEC — HIGH (ref 10.5–13.4)
RBC # BLD: 6.61 M/UL — HIGH (ref 3.8–5.2)
RBC # FLD: 19.5 % — HIGH (ref 10.3–14.5)
SODIUM SERPL-SCNC: 136 MMOL/L — SIGNIFICANT CHANGE UP (ref 135–145)
WBC # BLD: 8.73 K/UL — SIGNIFICANT CHANGE UP (ref 3.8–10.5)
WBC # FLD AUTO: 8.73 K/UL — SIGNIFICANT CHANGE UP (ref 3.8–10.5)

## 2023-03-08 PROCEDURE — 97162 PT EVAL MOD COMPLEX 30 MIN: CPT

## 2023-03-08 PROCEDURE — 71045 X-RAY EXAM CHEST 1 VIEW: CPT

## 2023-03-08 PROCEDURE — 82550 ASSAY OF CK (CPK): CPT

## 2023-03-08 PROCEDURE — 36415 COLL VENOUS BLD VENIPUNCTURE: CPT

## 2023-03-08 PROCEDURE — 85610 PROTHROMBIN TIME: CPT

## 2023-03-08 PROCEDURE — 70450 CT HEAD/BRAIN W/O DYE: CPT | Mod: MA

## 2023-03-08 PROCEDURE — 85730 THROMBOPLASTIN TIME PARTIAL: CPT

## 2023-03-08 PROCEDURE — 80053 COMPREHEN METABOLIC PANEL: CPT

## 2023-03-08 PROCEDURE — 99285 EMERGENCY DEPT VISIT HI MDM: CPT | Mod: 25

## 2023-03-08 PROCEDURE — 85025 COMPLETE CBC W/AUTO DIFF WBC: CPT

## 2023-03-08 PROCEDURE — 84484 ASSAY OF TROPONIN QUANT: CPT

## 2023-03-08 PROCEDURE — 85027 COMPLETE CBC AUTOMATED: CPT

## 2023-03-08 PROCEDURE — 87637 SARSCOV2&INF A&B&RSV AMP PRB: CPT

## 2023-03-08 PROCEDURE — 93005 ELECTROCARDIOGRAM TRACING: CPT

## 2023-03-08 RX ORDER — SPIRONOLACTONE 25 MG/1
1 TABLET, FILM COATED ORAL
Qty: 30 | Refills: 0
Start: 2023-03-08 | End: 2023-04-06

## 2023-03-08 RX ORDER — LOSARTAN POTASSIUM 100 MG/1
1 TABLET, FILM COATED ORAL
Qty: 0 | Refills: 0 | DISCHARGE

## 2023-03-08 RX ORDER — VALSARTAN 80 MG/1
1 TABLET ORAL
Qty: 30 | Refills: 0
Start: 2023-03-08 | End: 2023-04-06

## 2023-03-08 RX ORDER — CARVEDILOL PHOSPHATE 80 MG/1
1 CAPSULE, EXTENDED RELEASE ORAL
Qty: 60 | Refills: 0
Start: 2023-03-08 | End: 2023-04-06

## 2023-03-08 RX ORDER — CARVEDILOL PHOSPHATE 80 MG/1
6.25 CAPSULE, EXTENDED RELEASE ORAL EVERY 12 HOURS
Refills: 0 | Status: DISCONTINUED | OUTPATIENT
Start: 2023-03-08 | End: 2023-03-08

## 2023-03-08 RX ADMIN — Medication 100 MICROGRAM(S): at 05:57

## 2023-03-08 RX ADMIN — Medication 120 MILLIGRAM(S): at 05:57

## 2023-03-08 RX ADMIN — VALSARTAN 320 MILLIGRAM(S): 80 TABLET ORAL at 05:58

## 2023-03-08 RX ADMIN — SPIRONOLACTONE 25 MILLIGRAM(S): 25 TABLET, FILM COATED ORAL at 05:58

## 2023-03-08 NOTE — PROGRESS NOTE ADULT - SUBJECTIVE AND OBJECTIVE BOX
Neurology follow up note    RIRI [id number]      Interval History:    Patient feels ok no new complaints.    Allergies    Depakote (Hepatotoxicity)  iodine (Other)  sulfa drugs (Seizure)  Topamax (Other)    Intolerances        MEDICATIONS    levothyroxine 100 MICROGram(s) Oral daily  spironolactone 25 milliGRAM(s) Oral daily  valsartan 320 milliGRAM(s) Oral daily  verapamil  milliGRAM(s) Oral daily              Vital Signs Last 24 Hrs  T(C): 36.8 (08 Mar 2023 04:32), Max: 37.1 (07 Mar 2023 19:27)  T(F): 98.2 (08 Mar 2023 04:32), Max: 98.7 (07 Mar 2023 19:27)  HR: 90 (08 Mar 2023 04:32) (78 - 90)  BP: 151/80 (08 Mar 2023 04:32) (134/68 - 156/83)  BP(mean): --  RR: 18 (08 Mar 2023 04:32) (18 - 19)  SpO2: 94% (08 Mar 2023 04:32) (94% - 98%)    Parameters below as of 08 Mar 2023 04:32  Patient On (Oxygen Delivery Method): room air      REVIEW OF SYSTEMS:  Constitutional:  The patient denies fever, chills, or night sweats.  Head:  No headache.  Eyes:  No double vision or blurry vision.  Ears:  No ringing in the ears.  Neck:  Occasional neck pain.  She describes she has "crepitus."  Respiratory:  No shortness of breath.  Cardiovascular:  No chest pain.  Abdomen:  No nausea, vomiting, or abdominal pain.  Extremities/Neurological:  Occasionally does have numbness or tingling in her finger tips, most likely from neck disease.  Musculoskeletal:  Positive history joint pain from arthritis.  Genitourinary:  No burning upon urination.    PHYSICAL EXAMINATION:  HEENT:  Head:  Normocephalic, atraumatic.  Eyes:  No scleral icterus.  Ears:  Hearing bilaterally appeared intact.  NECK:  Supple.  RESPIRATORY:  Good air entry bilaterally.  CARDIOVASCULAR:  S1 and S2 heard.  ABDOMEN:  Soft and nontender.  EXTREMITIES:  No clubbing or cyanosis was noted.  NEUROLOGIC:  The patient is awake, alert, and oriented x3.  Extraocular movements were intact.  Full visual fields.  Speech was fluent.  Smile symmetric.  Motor:  Bilateral upper and lower 4+/5, has decreased range of motion of bilateral shoulders secondary to arthritis, not new.  Sensory:  Bilateral upper and lower intact to light touch.  No drift.                LABS:  CBC Full  -  ( 08 Mar 2023 07:15 )  WBC Count : 8.73 K/uL  RBC Count : 6.61 M/uL  Hemoglobin : 15.9 g/dL  Hematocrit : 49.2 %  Platelet Count - Automated : 548 K/uL  Mean Cell Volume : 74.4 fl  Mean Cell Hemoglobin : 24.1 pg  Mean Cell Hemoglobin Concentration : 32.3 gm/dL  Auto Neutrophil # : x  Auto Lymphocyte # : x  Auto Monocyte # : x  Auto Eosinophil # : x  Auto Basophil # : x  Auto Neutrophil % : x  Auto Lymphocyte % : x  Auto Monocyte % : x  Auto Eosinophil % : x  Auto Basophil % : x      03-08    136  |  106  |  15  ----------------------------<  88  4.0   |  24  |  0.78    Ca    8.6      08 Mar 2023 07:15    TPro  6.2  /  Alb  3.2<L>  /  TBili  0.9  /  DBili  x   /  AST  15  /  ALT  17  /  AlkPhos  67  03-08    Hemoglobin A1C:     LIVER FUNCTIONS - ( 08 Mar 2023 07:15 )  Alb: 3.2 g/dL / Pro: 6.2 g/dL / ALK PHOS: 67 U/L / ALT: 17 U/L / AST: 15 U/L / GGT: x           Vitamin B12   PT/INR - ( 08 Mar 2023 07:15 )   PT: 28.8 sec;   INR: 2.44 ratio         PTT - ( 07 Mar 2023 06:30 )  PTT:49.9 sec      RADIOLOGY    ANALYSIS AND PLAN:  This is an 87-year-old with a history of headaches and paresthesias.  For episode of headache, suspect this could be secondary to hypertensive urgency type of phenomenon.  Examination shows no clear sign to suggest a new cerebrovascular accident has ensued.  For episode of right arm paresthesias, in regards to electric shock-like sensation, suspect most likely cervically mediated.  It appears to have resolved.  The patient does have a history of occasional numbness and tingling in her fingertips on the right hand.  For history of atrial fibrillation, continue anticoagulation with a goal INR between 2 and 3.  For hypertension, strict control of blood pressure.  For hypothyroidism, continue the patient on Synthroid.    Greater than 45 minutes of time was spent with the patient, plan of care, reviewing data, with greater than 50% of the visit was spent counseling and/or coordinating care with multidisciplinary healthcare team  
Neurology follow up note    RIRI [id number]      Interval History:    Patient feels ok no new complaints.    Allergies    Depakote (Hepatotoxicity)  iodine (Other)  sulfa drugs (Seizure)  Topamax (Other)    Intolerances        MEDICATIONS    carvedilol 6.25 milliGRAM(s) Oral every 12 hours  levothyroxine 100 MICROGram(s) Oral daily  spironolactone 25 milliGRAM(s) Oral daily  valsartan 320 milliGRAM(s) Oral daily              Vital Signs Last 24 Hrs  T(C): 36.8 (08 Mar 2023 09:19), Max: 37.1 (07 Mar 2023 19:27)  T(F): 98.3 (08 Mar 2023 09:19), Max: 98.7 (07 Mar 2023 19:27)  HR: 95 (08 Mar 2023 09:19) (78 - 95)  BP: 136/79 (08 Mar 2023 09:19) (134/68 - 156/83)  BP(mean): --  RR: 18 (08 Mar 2023 09:19) (18 - 19)  SpO2: 94% (08 Mar 2023 09:19) (94% - 98%)    Parameters below as of 08 Mar 2023 09:19  Patient On (Oxygen Delivery Method): room air      REVIEW OF SYSTEMS:  Constitutional:  The patient denies fever, chills, or night sweats.  Head:  No headache.  Eyes:  No double vision or blurry vision.  Ears:  No ringing in the ears.  Neck:  Occasional neck pain.  She describes she has "crepitus."  Respiratory:  No shortness of breath.  Cardiovascular:  No chest pain.  Abdomen:  No nausea, vomiting, or abdominal pain.  Extremities/Neurological:  Occasionally does have numbness or tingling in her finger tips, most likely from neck disease.  Musculoskeletal:  Positive history joint pain from arthritis.  Genitourinary:  No burning upon urination.    PHYSICAL EXAMINATION:   HEENT:  Head:  Normocephalic, atraumatic.  Eyes:  No scleral icterus.  Ears:  Hearing bilaterally appeared intact.  NECK:  Supple.  RESPIRATORY:  Good air entry bilaterally.  CARDIOVASCULAR:  S1 and S2 heard.  ABDOMEN:  Soft and nontender.  EXTREMITIES:  No clubbing or cyanosis was noted.      NEUROLOGIC:  The patient is awake, alert, and oriented x3.  Extraocular movements were intact.  Full visual fields.  Speech was fluent.  Smile symmetric.  Motor:  Bilateral upper and lower 4+/5, has decreased range of motion of bilateral shoulders secondary to arthritis, not new.  Sensory:  Bilateral upper and lower intact to light touch.  No drift.                LABS:  CBC Full  -  ( 08 Mar 2023 07:15 )  WBC Count : 8.73 K/uL  RBC Count : 6.61 M/uL  Hemoglobin : 15.9 g/dL  Hematocrit : 49.2 %  Platelet Count - Automated : 548 K/uL  Mean Cell Volume : 74.4 fl  Mean Cell Hemoglobin : 24.1 pg  Mean Cell Hemoglobin Concentration : 32.3 gm/dL  Auto Neutrophil # : x  Auto Lymphocyte # : x  Auto Monocyte # : x  Auto Eosinophil # : x  Auto Basophil # : x  Auto Neutrophil % : x  Auto Lymphocyte % : x  Auto Monocyte % : x  Auto Eosinophil % : x  Auto Basophil % : x      03-08    136  |  106  |  15  ----------------------------<  88  4.0   |  24  |  0.78    Ca    8.6      08 Mar 2023 07:15    TPro  6.2  /  Alb  3.2<L>  /  TBili  0.9  /  DBili  x   /  AST  15  /  ALT  17  /  AlkPhos  67  03-08    Hemoglobin A1C:     LIVER FUNCTIONS - ( 08 Mar 2023 07:15 )  Alb: 3.2 g/dL / Pro: 6.2 g/dL / ALK PHOS: 67 U/L / ALT: 17 U/L / AST: 15 U/L / GGT: x           Vitamin B12   PT/INR - ( 08 Mar 2023 07:15 )   PT: 28.8 sec;   INR: 2.44 ratio         PTT - ( 07 Mar 2023 06:30 )  PTT:49.9 sec      RADIOLOGY    ANALYSIS AND PLAN:  This is an 87-year-old with a history of headaches and paresthesias.  For episode of headache, suspect this could be secondary to hypertensive urgency type of phenomenon.  Examination shows no clear sign to suggest a new cerebrovascular accident has ensued.  For episode of right arm paresthesias, in regards to electric shock-like sensation, suspect most likely cervically mediated.  It appears to have resolved.  The patient does have a history of occasional numbness and tingling in her fingertips on the right hand.  For history of atrial fibrillation, continue anticoagulation with a goal INR between 2 and 3.  For hypertension, strict control of blood pressure.  For hypothyroidism, continue the patient on Synthroid.  as per patient back to normal self      Greater than 45 minutes of time was spent with the patient, plan of care, reviewing data, with greater than 50% of the visit was spent counseling and/or coordinating care with multidisciplinary healthcare team  
Patient is a 87y old  Female who presents with a chief complaint of elevated BP (07 Mar 2023 06:33)    Date of servie : 03-07-23 @ 14:16  INTERVAL HPI/OVERNIGHT EVENTS:  T(C): 36.7 (03-07-23 @ 04:20), Max: 37.2 (03-06-23 @ 21:30)  HR: 86 (03-07-23 @ 12:18) (75 - 93)  BP: 156/83 (03-07-23 @ 12:18) (142/72 - 168/79)  RR: 19 (03-07-23 @ 12:18) (18 - 20)  SpO2: 98% (03-07-23 @ 12:18) (95% - 98%)  Wt(kg): --  I&O's Summary      LABS:                        16.1   8.50  )-----------( 621      ( 07 Mar 2023 06:30 )             50.6     03-07    138  |  107  |  12  ----------------------------<  72  3.9   |  24  |  0.79    Ca    9.0      07 Mar 2023 06:30    TPro  6.4  /  Alb  3.3  /  TBili  1.3<H>  /  DBili  x   /  AST  15  /  ALT  18  /  AlkPhos  73  03-07    PT/INR - ( 07 Mar 2023 06:30 )   PT: 30.2 sec;   INR: 2.56 ratio         PTT - ( 07 Mar 2023 06:30 )  PTT:49.9 sec    CAPILLARY BLOOD GLUCOSE                MEDICATIONS  (STANDING):  levothyroxine 100 MICROGram(s) Oral daily  spironolactone 25 milliGRAM(s) Oral daily  valsartan 320 milliGRAM(s) Oral daily  verapamil  milliGRAM(s) Oral daily    MEDICATIONS  (PRN):          PHYSICAL EXAM:  GENERAL: NAD, well-groomed, well-developed  HEAD:  Atraumatic, Normocephalic  CHEST/LUNG: Clear to percussion bilaterally; No rales, rhonchi, wheezing, or rubs  HEART: Regular rate and rhythm; No murmurs, rubs, or gallops  ABDOMEN: Soft, Nontender, Nondistended; Bowel sounds present  EXTREMITIES:  2+ Peripheral Pulses, No clubbing, cyanosis, or edema  LYMPH: No lymphadenopathy noted  SKIN: No rashes or lesions    Care Discussed with Consultants/Other Providers [ ] YES  [ ] NO
Patient is a 87y Female with a known history of :    HPI:  87-year-old female with past medical history of A-fib on Coumadin hypertension coming in for elevated blood pressure for the past 2 weeks 200s over 100.  Patient states she was at [facility] 2 weeks ago admitted and had multiple blood pressure adjustments currently on hydralazine losartan and verapamil.  Patient states has been having telehealth follow-up with Dr. Cha he still having elevated blood pressure.  Patient states today around 1130 had chest pressure intermittent now resolved so called Dr. Cha advised to come to emergency room.  Patient states last night she also had right arm numbness and tingling from the elbow down now resolved.  Patient also has been having intermittent headaches denies any blurry vision numbness tingling weakness.  Patient states at times she wakes up during the night because hears pounding heartbeat in her ears.  She denies any shortness of breath leg swelling recent travels. (06 Mar 2023 17:56)      REVIEW OF SYSTEMS:    CONSTITUTIONAL: No fever, weight loss, or fatigue  EYES: No eye pain, visual disturbances, or discharge  ENMT:  No difficulty hearing, tinnitus, vertigo; No sinus or throat pain  NECK: No pain or stiffness  BREASTS: No pain, masses, or nipple discharge  RESPIRATORY: No cough, wheezing, chills or hemoptysis; No shortness of breath  CARDIOVASCULAR: No chest pain, palpitations, dizziness, or leg swelling  GASTROINTESTINAL: No abdominal or epigastric pain. No nausea, vomiting, or hematemesis; No diarrhea or constipation. No melena or hematochezia.  GENITOURINARY: No dysuria, frequency, hematuria, or incontinence  NEUROLOGICAL: No headaches, memory loss, loss of strength, numbness, or tremors  SKIN: No itching, burning, rashes, or lesions   LYMPH NODES: No enlarged glands  ENDOCRINE: No heat or cold intolerance; No hair loss  MUSCULOSKELETAL: No joint pain or swelling; No muscle, back, or extremity pain  PSYCHIATRIC: No depression, anxiety, mood swings, or difficulty sleeping  HEME/LYMPH: No easy bruising, or bleeding gums  ALLERGY AND IMMUNOLOGIC: No hives or eczema    MEDICATIONS  (STANDING):  carvedilol 6.25 milliGRAM(s) Oral every 12 hours  levothyroxine 100 MICROGram(s) Oral daily  spironolactone 25 milliGRAM(s) Oral daily  valsartan 320 milliGRAM(s) Oral daily    MEDICATIONS  (PRN):      ALLERGIES: Depakote (Hepatotoxicity)  iodine (Other)  sulfa drugs (Seizure)  Topamax (Other)      FAMILY HISTORY:      PHYSICAL EXAMINATION:  -----------------------------  T(C): 36.8 (03-08-23 @ 04:32), Max: 37.1 (03-07-23 @ 19:27)  HR: 90 (03-08-23 @ 04:32) (78 - 90)  BP: 151/80 (03-08-23 @ 04:32) (134/68 - 156/83)  RR: 18 (03-08-23 @ 04:32) (18 - 19)  SpO2: 94% (03-08-23 @ 04:32) (94% - 98%)  Wt(kg): --        Constitutional: well developed, normal appearance, well groomed, well nourished, no deformities and no acute distress.   Eyes: the conjunctiva exhibited no abnormalities and the eyelids demonstrated no xanthelasmas.   HEENT: normal oral mucosa, no oral pallor and no oral cyanosis.   Neck: normal jugular venous A waves present, normal jugular venous V waves present and no jugular venous powers A waves.   Pulmonary: no respiratory distress, normal respiratory rhythm and effort, no accessory muscle use and lungs were clear to auscultation bilaterally.   Cardiovascular: heart rate and rhythm were normal, normal S1 and S2 and no murmur, gallop, rub, heave or thrill are present.   Abdomen: soft, non-tender, no hepato-splenomegaly and no abdominal mass palpated.   Musculoskeletal: the gait could not be assessed..   Extremities: no clubbing of the fingernails, no localized cyanosis, no petechial hemorrhages and no ischemic changes.   Skin: normal skin color and pigmentation, no rash, no venous stasis, no skin lesions, no skin ulcer and no xanthoma was observed.   Psychiatric: oriented to person, place, and time, the affect was normal, the mood was normal and not feeling anxious.     LABS:   --------  03-08    136  |  106  |  15  ----------------------------<  88  4.0   |  24  |  0.78    Ca    8.6      08 Mar 2023 07:15    TPro  6.2  /  Alb  3.2<L>  /  TBili  0.9  /  DBili  x   /  AST  15  /  ALT  17  /  AlkPhos  67  03-08                         15.9   8.73  )-----------( 548      ( 08 Mar 2023 07:15 )             49.2     PT/INR - ( 08 Mar 2023 07:15 )   PT: 28.8 sec;   INR: 2.44 ratio         PTT - ( 07 Mar 2023 06:30 )  PTT:49.9 sec

## 2023-03-08 NOTE — DISCHARGE NOTE PROVIDER - CARE PROVIDER_API CALL
Davion Galeano (DO)  Cardiovascular Disease  [address], [address]  [place] [address]  Phone: [phone]  Fax: [phone]  Follow Up Time:

## 2023-03-08 NOTE — DISCHARGE NOTE PROVIDER - ATTENDING DISCHARGE PHYSICAL EXAM:
Jahaira Quiroz  [address] [facility] [address]      2020      : 1999    Dear Ms. Quiroz:    We have been trying to reach you without success.  Please call my office at [phone].    Thank you for your timely response.    Sincerely,     Carmita Sanchez CNP         Attending Discharge Physical Examination:

## 2023-03-08 NOTE — DISCHARGE NOTE PROVIDER - NSDCCPCAREPLAN_GEN_ALL_CORE_FT
PRINCIPAL DISCHARGE DIAGNOSIS  Diagnosis: Elevated blood pressure reading  Assessment and Plan of Treatment:       SECONDARY DISCHARGE DIAGNOSES  Diagnosis: Atypical chest pain  Assessment and Plan of Treatment:

## 2023-03-08 NOTE — DISCHARGE NOTE PROVIDER - NSDCMRMEDTOKEN_GEN_ALL_CORE_FT
hydrALAZINE 25 mg oral tablet: 1 tab(s) orally 3 times a day  levothyroxine 100 mcg (0.1 mg) oral tablet: 1 tab(s) orally once a day  verapamil 120 mg/24 hours oral capsule, extended release: 1 cap(s) orally once a day  warfarin 3 mg oral tablet: 1 tab(s) orally once a day   hydrALAZINE 25 mg oral tablet: 1 tab(s) orally 3 times a day  levothyroxine 100 mcg (0.1 mg) oral tablet: 1 tab(s) orally once a day  spironolactone 25 mg oral tablet: 1 tab(s) orally once a day  valsartan 320 mg oral tablet: 1 tab(s) orally once a day  verapamil 120 mg/24 hours oral capsule, extended release: 1 cap(s) orally once a day  warfarin 3 mg oral tablet: 1 tab(s) orally once a day   carvedilol 6.25 mg oral tablet: 1 tab(s) orally every 12 hours  hydrALAZINE 25 mg oral tablet: 1 tab(s) orally 3 times a day  levothyroxine 100 mcg (0.1 mg) oral tablet: 1 tab(s) orally once a day  spironolactone 25 mg oral tablet: 1 tab(s) orally once a day  valsartan 320 mg oral tablet: 1 tab(s) orally once a day  verapamil 120 mg/24 hours oral capsule, extended release: 1 cap(s) orally once a day  warfarin 3 mg oral tablet: 1 tab(s) orally once a day

## 2023-03-08 NOTE — DISCHARGE NOTE PROVIDER - DISCHARGE SERVICE FOR PATIENT
Telephone Encounter by Anastasia Corbin NCMA at 05/11/18 01:55 PM     Author:  Anastasia Corbin NCMA Service:  (none) Author Type:  Certified Medical Assistant     Filed:  05/11/18 01:55 PM Encounter Date:  5/11/2018 Status:  Signed     :  Anastasia Corbin NCMA (Certified Medical Assistant)       From: [facility]  To: Natividad Byrne PAC, MMS  Sent: 5/11/2018  8:30 AM CDT  Subject: Medication Renewal Request    Original authorizing provider: THALIA Aquino MMS    [facility] would like a refill of the following medications:  triamterene-hydrochlorothiazide (DYAZIDE) 37.5-25 MG per Cap [facility]    Preferred pharmacy: [facility] [address]    Comment:         Revision History        Date/Time User Provider Type Action    > 05/11/18 01:55 PM Anastasia Corbin NCMA Certified Medical Assistant Sign    Attribution information within the note text is not available.            
on the discharge service for the patient. I have reviewed and made amendments to the documentation where necessary.
Negative/Unknown

## 2023-03-08 NOTE — DISCHARGE NOTE NURSING/CASE MANAGEMENT/SOCIAL WORK - NSDCPEPTCOWAFU_GEN_ALL_CORE
"Requested Prescriptions   Pending Prescriptions Disp Refills     pramipexole (MIRAPEX) 0.5 MG tablet [Pharmacy Med Name: PRAMIPEXOLE DIHYDROCHLORIDE 0.5 MG Tablet] 90 tablet 0     Sig: TAKE 1 TABLET AT BEDTIME   Last Written Prescription Date:  7/18/19  Last Fill Quantity: 90,  # refills: 1   Last office visit: 11/6/2019 with prescribing provider:  5/22/19   Future Office Visit:        Antiparkinson's Agents Protocol Failed - 11/30/2019  9:21 AM        Failed - ALT on record in past 12 months         Recent Labs   Lab Test 07/18/18  1128   ALT 28             Passed - Blood pressure under 140/90 in past 12 months     BP Readings from Last 3 Encounters:   11/15/19 110/72   10/15/19 110/68   07/09/19 120/78                 Passed - CBC on record in past 12 months     Recent Labs   Lab Test 05/16/19  0817 04/25/19  1127   WBC  --  10.7   RBC  --  4.74   HGB 13.3 14.7   HCT  --  45.1   PLT  --  217                 Passed - Serum Creatinine on file in past 12 months     Recent Labs   Lab Test 05/16/19  0817   CR 1.00             Passed - Medication is active on med list        Passed - Patient is age 18 or older        Passed - Recent (6 mo) or future (30 days) visit within the authorizing provider's specialty     Patient had office visit in the last 6 months or has a visit in the next 30 days with authorizing provider or within the authorizing provider's specialty.  See \"Patient Info\" tab in inbasket, or \"Choose Columns\" in Meds & Orders section of the refill encounter.            "
Routing refill request to provider for review/approval because:  Labs not current:  KAITLIN JordanN, RN  [facility]      
Go for blood tests as directed. Because your dose is based on the PT/INR blood test, it is very important that you get your blood tested on the scheduled date and time and to keep your health care provider appointments.   Please follow up with your doctor within 3 days of discharge to schedule your next blood test.

## 2023-03-08 NOTE — DISCHARGE NOTE PROVIDER - HOSPITAL COURSE
87-year-old female with past medical history of A-fib on Coumadin hypertension coming in for elevated blood pressure for the past 2 weeks 200s over 100.  Patient states she was at [facility] 2 weeks ago admitted and had multiple blood pressure adjustments currently on hydralazine losartan and verapamil.  Patient states has been having telehealth follow-up with Dr. Cha he still having elevated blood pressure.  Patient states today around 1130 had chest pressure intermittent now resolved so called Dr. Cha advised to come to emergency room.  Patient states last night she also had right arm numbness and tingling from the elbow down now resolved.  Patient also has been having intermittent headaches denies any blurry vision numbness tingling weakness.  Patient states at times she wakes up during the night because hears pounding heartbeat in her ears.  She denies any shortness of breath leg swelling recent travels.    1 Chest pain  - ruled out  ACS  - telemonitor  - no further  ischemia meeks as per cards    2 Accelerated hypertension  - BP above 200 at home  - stable here  - cw currrent meds  - DASH diet    3 Afib  - check INR and dose coumadin daily  - cw verapamil      87-year-old female with past medical history of A-fib on Coumadin hypertension coming in for elevated blood pressure for the past 2 weeks 200s over 100.  Patient states she was at [facility] 2 weeks ago admitted and had multiple blood pressure adjustments currently on hydralazine losartan and verapamil.  Patient states has been having telehealth follow-up with Dr. Cha he still having elevated blood pressure.  Patient states today around 1130 had chest pressure intermittent now resolved so called Dr. Cha advised to come to emergency room.  Patient states last night she also had right arm numbness and tingling from the elbow down now resolved.  Patient also has been having intermittent headaches denies any blurry vision numbness tingling weakness.  Patient states at times she wakes up during the night because hears pounding heartbeat in her ears.  She denies any shortness of breath leg swelling recent travels.    1 Chest pain  - ruled out  ACS  - telemonitor  - no further  ischemia meeks as per cards    2 Accelerated hypertension  - BP above 200 at home  - stable here  - cw currrent meds  - DASH diet    3 Afib  - check INR and dose coumadin daily  - cw verapamil     4 thrombocytosis  - sec to JUSTICE 2  - pt follows with hematologist regularly

## 2023-03-08 NOTE — CASE MANAGEMENT PROGRESS NOTE - NSCMPROGRESSNOTE_GEN_ALL_CORE
Per MD, patient is medically cleared for discharge today. Referral to [facility] for resumption of visiting nurse 3/9/2023 has been accepted. CM met with the patient and her daughter at the bedside to discuss transition care plan for today with [facility] with [facility]. CM also discussed the importance of outpatient follow up. Patient's daughter stated she has received a call from the visiting nurse this morning. Both the patient and daughter verbalized understanding of the transition care plan and are in agreement. Daughter will transport the patient home. Bedside RN aware of discharge plan. CM remains available.

## 2023-03-08 NOTE — PROGRESS NOTE ADULT - ASSESSMENT
87-year-old female with past medical history of A-fib on Coumadin hypertension coming in for elevated blood pressure for the past 2 weeks 200s over 100.  Patient states she was at [facility] 2 weeks ago admitted and had multiple blood pressure adjustments currently on hydralazine losartan and verapamil.  Patient states has been having telehealth follow-up with Dr. Cha he still having elevated blood pressure.  Patient states today around 1130 had chest pressure intermittent now resolved so called Dr. Cha advised to come to emergency room.  Patient states last night she also had right arm numbness and tingling from the elbow down now resolved.  Patient also has been having intermittent headaches denies any blurry vision numbness tingling weakness.  Patient states at times she wakes up during the night because hears pounding heartbeat in her ears.  She denies any shortness of breath leg swelling recent travels.    1 Chest pain  - ruled out  ACS  - telemonitor  - ischemia meeks as per cards    2 Accelerated hypertension  - BP above 200 at home  - now stable  - cw home meds for now  - DASH diet    3 Afib  - check INR and dose coumadin daily  - cw verapamil     PT and dc planning   
87F with HTN, AF on coumadin, recent hospitalization at [facility] for HTN s/p medication changes a/w chest discomfort and recent LEFT arm weakness/nerve pain.  Strokes are noted on CT head    Suggest:    1.  HTN urgency  - medication adjustments  Losartan 100 mg replaced by Valsartan 320 mg  Added spironolactone  Verapamil replaced by Carvedilol 6.25 mg BID for rate control and HTN.  D/c prior hydralazine    2.  Afib rate controlled  - c/w coumadin INR 2 -3    3. Chest discomfort  Trop negative x 2 ruled out for MI  Outpt stress test    4. Will follow

## 2023-03-08 NOTE — DISCHARGE NOTE NURSING/CASE MANAGEMENT/SOCIAL WORK - PATIENT PORTAL LINK FT
You can access the FollowMyHealth Patient Portal offered by [facility] by registering at the following website: [email or web address]. By joining [employer]’s FollowMyHealth portal, you will also be able to view your health information using other applications (apps) compatible with our system.

## 2023-03-17 ENCOUNTER — INPATIENT (INPATIENT)
Facility: HOSPITAL | Age: 88
LOS: 3 days | Discharge: HOME | DRG: 309 | End: 2023-03-21
Attending: INTERNAL MEDICINE | Admitting: INTERNAL MEDICINE
Payer: MEDICARE

## 2023-03-17 VITALS
SYSTOLIC BLOOD PRESSURE: 138 MMHG | HEART RATE: 73 BPM | DIASTOLIC BLOOD PRESSURE: 77 MMHG | WEIGHT: 149.91 LBS | RESPIRATION RATE: 16 BRPM | OXYGEN SATURATION: 95 % | TEMPERATURE: 98 F | HEIGHT: 66 IN

## 2023-03-17 DIAGNOSIS — R53.1 WEAKNESS: ICD-10-CM

## 2023-03-17 DIAGNOSIS — Z98.890 OTHER SPECIFIED POSTPROCEDURAL STATES: Chronic | ICD-10-CM

## 2023-03-17 DIAGNOSIS — Z90.710 ACQUIRED ABSENCE OF BOTH CERVIX AND UTERUS: Chronic | ICD-10-CM

## 2023-03-17 DIAGNOSIS — I10 ESSENTIAL (PRIMARY) HYPERTENSION: ICD-10-CM

## 2023-03-17 DIAGNOSIS — N12 TUBULO-INTERSTITIAL NEPHRITIS, NOT SPECIFIED AS ACUTE OR CHRONIC: ICD-10-CM

## 2023-03-17 DIAGNOSIS — I48.91 UNSPECIFIED ATRIAL FIBRILLATION: ICD-10-CM

## 2023-03-17 LAB
ALBUMIN SERPL ELPH-MCNC: 3.2 G/DL — LOW (ref 3.3–5)
ALP SERPL-CCNC: 73 U/L — SIGNIFICANT CHANGE UP (ref 40–120)
ALT FLD-CCNC: 30 U/L — SIGNIFICANT CHANGE UP (ref 12–78)
ANION GAP SERPL CALC-SCNC: 8 MMOL/L — SIGNIFICANT CHANGE UP (ref 5–17)
ANISOCYTOSIS BLD QL: SLIGHT — SIGNIFICANT CHANGE UP
APPEARANCE UR: ABNORMAL
AST SERPL-CCNC: 20 U/L — SIGNIFICANT CHANGE UP (ref 15–37)
BACTERIA # UR AUTO: ABNORMAL
BASOPHILS # BLD AUTO: 0.06 K/UL — SIGNIFICANT CHANGE UP (ref 0–0.2)
BASOPHILS NFR BLD AUTO: 0.7 % — SIGNIFICANT CHANGE UP (ref 0–2)
BILIRUB SERPL-MCNC: 0.8 MG/DL — SIGNIFICANT CHANGE UP (ref 0.2–1.2)
BILIRUB UR-MCNC: NEGATIVE — SIGNIFICANT CHANGE UP
BUN SERPL-MCNC: 12 MG/DL — SIGNIFICANT CHANGE UP (ref 7–23)
CALCIUM SERPL-MCNC: 8.8 MG/DL — SIGNIFICANT CHANGE UP (ref 8.5–10.1)
CHLORIDE SERPL-SCNC: 99 MMOL/L — SIGNIFICANT CHANGE UP (ref 96–108)
CK SERPL-CCNC: 28 U/L — SIGNIFICANT CHANGE UP (ref 26–192)
CK SERPL-CCNC: 32 U/L — SIGNIFICANT CHANGE UP (ref 26–192)
CO2 SERPL-SCNC: 22 MMOL/L — SIGNIFICANT CHANGE UP (ref 22–31)
COLOR SPEC: YELLOW — SIGNIFICANT CHANGE UP
CREAT SERPL-MCNC: 0.73 MG/DL — SIGNIFICANT CHANGE UP (ref 0.5–1.3)
DIFF PNL FLD: NEGATIVE — SIGNIFICANT CHANGE UP
EGFR: 80 ML/MIN/1.73M2 — SIGNIFICANT CHANGE UP
EOSINOPHIL # BLD AUTO: 0.8 K/UL — HIGH (ref 0–0.5)
EOSINOPHIL NFR BLD AUTO: 9.7 % — HIGH (ref 0–6)
EPI CELLS # UR: SIGNIFICANT CHANGE UP
GLUCOSE SERPL-MCNC: 129 MG/DL — HIGH (ref 70–99)
GLUCOSE UR QL: NEGATIVE — SIGNIFICANT CHANGE UP
HCT VFR BLD CALC: 48.3 % — HIGH (ref 34.5–45)
HGB BLD-MCNC: 15.2 G/DL — SIGNIFICANT CHANGE UP (ref 11.5–15.5)
IMM GRANULOCYTES NFR BLD AUTO: 0.2 % — SIGNIFICANT CHANGE UP (ref 0–0.9)
INR BLD: 5.02 RATIO — CRITICAL HIGH (ref 0.88–1.16)
KETONES UR-MCNC: NEGATIVE — SIGNIFICANT CHANGE UP
LACTATE SERPL-SCNC: 0.9 MMOL/L — SIGNIFICANT CHANGE UP (ref 0.7–2)
LEUKOCYTE ESTERASE UR-ACNC: NEGATIVE — SIGNIFICANT CHANGE UP
LYMPHOCYTES # BLD AUTO: 1.16 K/UL — SIGNIFICANT CHANGE UP (ref 1–3.3)
LYMPHOCYTES # BLD AUTO: 14.1 % — SIGNIFICANT CHANGE UP (ref 13–44)
MCHC RBC-ENTMCNC: 23.3 PG — LOW (ref 27–34)
MCHC RBC-ENTMCNC: 31.5 GM/DL — LOW (ref 32–36)
MCV RBC AUTO: 74.1 FL — LOW (ref 80–100)
MONOCYTES # BLD AUTO: 0.79 K/UL — SIGNIFICANT CHANGE UP (ref 0–0.9)
MONOCYTES NFR BLD AUTO: 9.6 % — SIGNIFICANT CHANGE UP (ref 2–14)
NEUTROPHILS # BLD AUTO: 5.39 K/UL — SIGNIFICANT CHANGE UP (ref 1.8–7.4)
NEUTROPHILS NFR BLD AUTO: 65.7 % — SIGNIFICANT CHANGE UP (ref 43–77)
NITRITE UR-MCNC: NEGATIVE — SIGNIFICANT CHANGE UP
NRBC # BLD: 0 /100 WBCS — SIGNIFICANT CHANGE UP (ref 0–0)
NT-PROBNP SERPL-SCNC: 803 PG/ML — HIGH (ref 0–450)
PH UR: 6.5 — SIGNIFICANT CHANGE UP (ref 5–8)
PLAT MORPH BLD: NORMAL — SIGNIFICANT CHANGE UP
PLATELET # BLD AUTO: 615 K/UL — HIGH (ref 150–400)
POIKILOCYTOSIS BLD QL AUTO: SLIGHT — SIGNIFICANT CHANGE UP
POTASSIUM SERPL-MCNC: 4.1 MMOL/L — SIGNIFICANT CHANGE UP (ref 3.5–5.3)
POTASSIUM SERPL-SCNC: 4.1 MMOL/L — SIGNIFICANT CHANGE UP (ref 3.5–5.3)
PROT SERPL-MCNC: 6 G/DL — SIGNIFICANT CHANGE UP (ref 6–8.3)
PROT UR-MCNC: NEGATIVE — SIGNIFICANT CHANGE UP
PROTHROM AB SERPL-ACNC: 59.7 SEC — HIGH (ref 10.5–13.4)
RAPID RVP RESULT: SIGNIFICANT CHANGE UP
RBC # BLD: 6.52 M/UL — HIGH (ref 3.8–5.2)
RBC # FLD: 20.4 % — HIGH (ref 10.3–14.5)
RBC BLD AUTO: SIGNIFICANT CHANGE UP
RBC CASTS # UR COMP ASSIST: SIGNIFICANT CHANGE UP /HPF (ref 0–4)
SARS-COV-2 RNA SPEC QL NAA+PROBE: SIGNIFICANT CHANGE UP
SODIUM SERPL-SCNC: 129 MMOL/L — LOW (ref 135–145)
SP GR SPEC: 1.01 — SIGNIFICANT CHANGE UP (ref 1.01–1.02)
TROPONIN I, HIGH SENSITIVITY RESULT: 10.1 NG/L — SIGNIFICANT CHANGE UP
TROPONIN I, HIGH SENSITIVITY RESULT: 8.2 NG/L — SIGNIFICANT CHANGE UP
UROBILINOGEN FLD QL: NEGATIVE — SIGNIFICANT CHANGE UP
WBC # BLD: 8.22 K/UL — SIGNIFICANT CHANGE UP (ref 3.8–10.5)
WBC # FLD AUTO: 8.22 K/UL — SIGNIFICANT CHANGE UP (ref 3.8–10.5)
WBC UR QL: SIGNIFICANT CHANGE UP

## 2023-03-17 PROCEDURE — 70450 CT HEAD/BRAIN W/O DYE: CPT | Mod: 26,MA

## 2023-03-17 PROCEDURE — 71045 X-RAY EXAM CHEST 1 VIEW: CPT | Mod: 26

## 2023-03-17 PROCEDURE — 74176 CT ABD & PELVIS W/O CONTRAST: CPT | Mod: 26,MA

## 2023-03-17 PROCEDURE — 93010 ELECTROCARDIOGRAM REPORT: CPT

## 2023-03-17 PROCEDURE — 99285 EMERGENCY DEPT VISIT HI MDM: CPT

## 2023-03-17 RX ORDER — SODIUM CHLORIDE 9 MG/ML
1000 INJECTION INTRAMUSCULAR; INTRAVENOUS; SUBCUTANEOUS ONCE
Refills: 0 | Status: COMPLETED | OUTPATIENT
Start: 2023-03-17 | End: 2023-03-17

## 2023-03-17 RX ORDER — CEFTRIAXONE 500 MG/1
1000 INJECTION, POWDER, FOR SOLUTION INTRAMUSCULAR; INTRAVENOUS ONCE
Refills: 0 | Status: COMPLETED | OUTPATIENT
Start: 2023-03-17 | End: 2023-03-17

## 2023-03-17 RX ORDER — INFLUENZA VIRUS VACCINE 15; 15; 15; 15 UG/.5ML; UG/.5ML; UG/.5ML; UG/.5ML
0.7 SUSPENSION INTRAMUSCULAR ONCE
Refills: 0 | Status: DISCONTINUED | OUTPATIENT
Start: 2023-03-17 | End: 2023-03-21

## 2023-03-17 RX ORDER — LEVOTHYROXINE SODIUM 125 MCG
100 TABLET ORAL DAILY
Refills: 0 | Status: DISCONTINUED | OUTPATIENT
Start: 2023-03-17 | End: 2023-03-21

## 2023-03-17 RX ORDER — VALSARTAN 80 MG/1
320 TABLET ORAL DAILY
Refills: 0 | Status: DISCONTINUED | OUTPATIENT
Start: 2023-03-17 | End: 2023-03-21

## 2023-03-17 RX ORDER — VERAPAMIL HCL 240 MG
180 CAPSULE, EXTENDED RELEASE PELLETS 24 HR ORAL DAILY
Refills: 0 | Status: DISCONTINUED | OUTPATIENT
Start: 2023-03-17 | End: 2023-03-18

## 2023-03-17 RX ORDER — CARVEDILOL PHOSPHATE 80 MG/1
6.25 CAPSULE, EXTENDED RELEASE ORAL EVERY 12 HOURS
Refills: 0 | Status: DISCONTINUED | OUTPATIENT
Start: 2023-03-17 | End: 2023-03-21

## 2023-03-17 RX ADMIN — CARVEDILOL PHOSPHATE 6.25 MILLIGRAM(S): 80 CAPSULE, EXTENDED RELEASE ORAL at 17:25

## 2023-03-17 RX ADMIN — SODIUM CHLORIDE 1000 MILLILITER(S): 9 INJECTION INTRAMUSCULAR; INTRAVENOUS; SUBCUTANEOUS at 10:59

## 2023-03-17 RX ADMIN — SODIUM CHLORIDE 1000 MILLILITER(S): 9 INJECTION INTRAMUSCULAR; INTRAVENOUS; SUBCUTANEOUS at 17:14

## 2023-03-17 RX ADMIN — CEFTRIAXONE 100 MILLIGRAM(S): 500 INJECTION, POWDER, FOR SOLUTION INTRAMUSCULAR; INTRAVENOUS at 14:02

## 2023-03-17 NOTE — ED PROVIDER NOTE - CLINICAL SUMMARY MEDICAL DECISION MAKING FREE TEXT BOX
Generalized weakness with abdominal tenderness.  Acute hypotension, improved upon arrival.  Will check labs, CT abdomen, IV fluids, UA, CT head, reeval

## 2023-03-17 NOTE — ED ADULT TRIAGE NOTE - SPO2 (%)
Guardian of pt calls in wanting to know how to get pt tested for covid    Pt may have been exposed at day care to a Positive person last Tuesday  Pt having NO symptoms    Advised >  [email or web address]  [facility] web-site      Protocol and care advice reviewed  Caller states understanding of the recommended disposition  Advised to call back if further questions or concerns    Rai Lantigua , RN / [facility]    Reason for Disposition    [1] Caller requesting NON-URGENT health information AND [2] PCP's office is the best resource    Protocols used: INFORMATION ONLY CALL-A-AH       95

## 2023-03-17 NOTE — ED ADULT TRIAGE NOTE - CHIEF COMPLAINT QUOTE
Pt BIB EMS for c/c of hypotension. recently changed hydralazine dose systolic in 90s at home. Pt also lethargic and weak today.

## 2023-03-17 NOTE — H&P ADULT - PROBLEM SELECTOR PLAN 1
BP labile  patient unclear if palpitations coincided with hypotensive episode  dc spironolactone re hyponatremia as well  dc hydralazine as well (?eitology of her diarrhea)

## 2023-03-17 NOTE — H&P ADULT - NSHPPHYSICALEXAM_GEN_ALL_CORE
gen well appearing  heent nl  neck supple, no jvd  lungs cta  cor irreg irreg  abd soft, suprapubic tenderness to palp  ext -c/c/e  neuro a and o x3, mildly forgetful, grossly non focal  derm neg

## 2023-03-17 NOTE — ED PROVIDER NOTE - CARE PLAN
Principal Discharge DX:	Weakness  Secondary Diagnosis:	Pyelonephritis  Secondary Diagnosis:	Labile blood pressure   1

## 2023-03-17 NOTE — ED ADULT NURSE NOTE - OBJECTIVE STATEMENT
88y/o female A&ox4, ambulatory at baseline, received in rm 4b. pt c/o lethargy, weakness since this AM. pmhx htn on BP meds. as per daughter, has recently changed dosing of hydralazine medication. pt took hydralazine this AM, went to check BP 1 hour post med and was hypotensives to 90s systolic. denies n/v, sob, cp. pt lethargic at this time, answering questions appropriately but slow to respond. family at bedside for collateral. 20g iv placed in RAC, labs sent. covid sent. md at bedside for eval. bed in lowest position, side rails up, call bell in hand, safety maintained. awaiting further orders. will continue to monitor.

## 2023-03-17 NOTE — H&P ADULT - HISTORY OF PRESENT ILLNESS
87-year-old female with history of A-fib on Coumadin, hypertension presents today for weakness and low blood pressure.  Patient was recently in the hospital on March 7 and 8 for elevated blood pressure, and had her meds increased.  Patient took her usual doses today, however is feeling weak and had a borderline low blood pressure as per EMS.  No acute chest pain.  No shortness of breath.  No fall or recent trauma.  No neck or back pain.  No known fever or chills.  No cough/URI.  Patient does complain of some abdominal discomfort.  No known dysuria or hematuria.  No aggravating or alleviating factors otherwise noted.  Patient previously vaccine for COVID, no recent exposures

## 2023-03-17 NOTE — CHART NOTE - NSCHARTNOTEFT_GEN_A_CORE
Called by RN, pt INR: 5.02, pt is on Coumadin 3 mg qd for afib.   No signs of symptoms of bleeding. Pt remains asx.     T(F): 97.3 (03-17-23 @ 18:01), Max: 98 (03-17-23 @ 12:03)  HR: 80 (03-17-23 @ 18:01) (73 - 80)  BP: 158/82 (03-17-23 @ 18:01) (138/77 - 162/86)  RR: 18 (03-17-23 @ 18:01) (16 - 18)  SpO2: 98% (03-17-23 @ 18:01) (95% - 98%)    Assessment/Plan: 87-year-old female with history of A-fib on Coumadin, hypertension presents today for weakness and low blood pressure.     #Supratherapeutic INR    - INR: 5.02   - Will HOLD home Coumadin 3 mg tonight   - Monitor for s/s bleeding   - Monitor daily INR  - Will continue to monitor, RN to call if any changes

## 2023-03-17 NOTE — ED PROVIDER NOTE - CONSTITUTIONAL, MLM
normal... Well appearing, awake, alert, oriented to person, place, time/situation and in no apparent distress. Slight lethargy

## 2023-03-17 NOTE — ED PROVIDER NOTE - DIFFERENTIAL DIAGNOSIS
Differential Diagnosis Rule out acute infection, overdose of hypertension medications, acute intracranial process, electrolyte abnormality, acute abd path, other acute pathology

## 2023-03-17 NOTE — PATIENT PROFILE ADULT - FALL HARM RISK - HARM RISK INTERVENTIONS

## 2023-03-17 NOTE — ED PROVIDER NOTE - PROGRESS NOTE DETAILS
Discussed with Dr. Wang, will see patient, pending 2 sets cardiac enzymes Patient is now more awake, still complaining of generalized weakness.  Patient is having some abdominal discomfort as well.  Discussed with patient and son regarding nature of CT findings, will start antibiotics for possible urinary infection, and will have patient monitored for her blood pressure with cardiology consultation. Discussed with Dr. Daryl Perlman, will see patient to admit for Dr. Herrera Morris.

## 2023-03-17 NOTE — ED PROVIDER NOTE - OBJECTIVE STATEMENT
87-year-old female with history of A-fib on Coumadin, hypertension presents today for weakness and low blood pressure.  Patient was recently in the hospital on March 7 and 8 for elevated blood pressure, and had her meds increased.  Patient took her usual doses today, however is feeling weak and had a borderline low blood pressure as per EMS.  No acute chest pain.  No shortness of breath.  No fall or recent trauma.  No neck or back pain.  No known fever or chills.  No cough/URI.  Patient does complain of some abdominal discomfort.  No known dysuria or hematuria.  No aggravating or alleviating factors otherwise noted.  Patient previously vaccine for COVID, no recent exposures.

## 2023-03-17 NOTE — ED PROVIDER NOTE - CARDIAC, MLM
oriented to person, place, time and situation/person Normal rate, regular rhythm.  Heart sounds S1, S2.  No murmurs, rubs or gallops.

## 2023-03-17 NOTE — H&P ADULT - PROBLEM SELECTOR PLAN 3
doubt infectious process re bland urine, no fever, no WBC  ?perinephric stranding on CT  check bladder scan to rule out retention

## 2023-03-18 LAB
ALBUMIN SERPL ELPH-MCNC: 3.2 G/DL — LOW (ref 3.3–5)
ALP SERPL-CCNC: 84 U/L — SIGNIFICANT CHANGE UP (ref 40–120)
ALT FLD-CCNC: 27 U/L — SIGNIFICANT CHANGE UP (ref 12–78)
ANION GAP SERPL CALC-SCNC: 6 MMOL/L — SIGNIFICANT CHANGE UP (ref 5–17)
AST SERPL-CCNC: 16 U/L — SIGNIFICANT CHANGE UP (ref 15–37)
BASOPHILS # BLD AUTO: 0.08 K/UL — SIGNIFICANT CHANGE UP (ref 0–0.2)
BASOPHILS NFR BLD AUTO: 0.9 % — SIGNIFICANT CHANGE UP (ref 0–2)
BILIRUB SERPL-MCNC: 1.1 MG/DL — SIGNIFICANT CHANGE UP (ref 0.2–1.2)
BUN SERPL-MCNC: 8 MG/DL — SIGNIFICANT CHANGE UP (ref 7–23)
CALCIUM SERPL-MCNC: 8.7 MG/DL — SIGNIFICANT CHANGE UP (ref 8.5–10.1)
CHLORIDE SERPL-SCNC: 104 MMOL/L — SIGNIFICANT CHANGE UP (ref 96–108)
CO2 SERPL-SCNC: 21 MMOL/L — LOW (ref 22–31)
CREAT SERPL-MCNC: 0.71 MG/DL — SIGNIFICANT CHANGE UP (ref 0.5–1.3)
EGFR: 82 ML/MIN/1.73M2 — SIGNIFICANT CHANGE UP
EOSINOPHIL # BLD AUTO: 0.94 K/UL — HIGH (ref 0–0.5)
EOSINOPHIL NFR BLD AUTO: 10.8 % — HIGH (ref 0–6)
GLUCOSE SERPL-MCNC: 119 MG/DL — HIGH (ref 70–99)
HCT VFR BLD CALC: 50.5 % — HIGH (ref 34.5–45)
HGB BLD-MCNC: 15.8 G/DL — HIGH (ref 11.5–15.5)
IMM GRANULOCYTES NFR BLD AUTO: 0.3 % — SIGNIFICANT CHANGE UP (ref 0–0.9)
INR BLD: 4.79 RATIO — HIGH (ref 0.88–1.16)
LYMPHOCYTES # BLD AUTO: 1.34 K/UL — SIGNIFICANT CHANGE UP (ref 1–3.3)
LYMPHOCYTES # BLD AUTO: 15.4 % — SIGNIFICANT CHANGE UP (ref 13–44)
MCHC RBC-ENTMCNC: 23.5 PG — LOW (ref 27–34)
MCHC RBC-ENTMCNC: 31.3 GM/DL — LOW (ref 32–36)
MCV RBC AUTO: 75 FL — LOW (ref 80–100)
MONOCYTES # BLD AUTO: 0.7 K/UL — SIGNIFICANT CHANGE UP (ref 0–0.9)
MONOCYTES NFR BLD AUTO: 8.1 % — SIGNIFICANT CHANGE UP (ref 2–14)
NEUTROPHILS # BLD AUTO: 5.59 K/UL — SIGNIFICANT CHANGE UP (ref 1.8–7.4)
NEUTROPHILS NFR BLD AUTO: 64.5 % — SIGNIFICANT CHANGE UP (ref 43–77)
NRBC # BLD: 0 /100 WBCS — SIGNIFICANT CHANGE UP (ref 0–0)
PLATELET # BLD AUTO: 637 K/UL — HIGH (ref 150–400)
POTASSIUM SERPL-MCNC: 4.2 MMOL/L — SIGNIFICANT CHANGE UP (ref 3.5–5.3)
POTASSIUM SERPL-SCNC: 4.2 MMOL/L — SIGNIFICANT CHANGE UP (ref 3.5–5.3)
PROT SERPL-MCNC: 6.2 G/DL — SIGNIFICANT CHANGE UP (ref 6–8.3)
PROTHROM AB SERPL-ACNC: 56.9 SEC — HIGH (ref 10.5–13.4)
RBC # BLD: 6.73 M/UL — HIGH (ref 3.8–5.2)
RBC # FLD: 20.8 % — HIGH (ref 10.3–14.5)
SODIUM SERPL-SCNC: 131 MMOL/L — LOW (ref 135–145)
TSH SERPL-MCNC: 2.87 UIU/ML — SIGNIFICANT CHANGE UP (ref 0.36–3.74)
WBC # BLD: 8.68 K/UL — SIGNIFICANT CHANGE UP (ref 3.8–10.5)
WBC # FLD AUTO: 8.68 K/UL — SIGNIFICANT CHANGE UP (ref 3.8–10.5)

## 2023-03-18 RX ORDER — ASPIRIN/CALCIUM CARB/MAGNESIUM 324 MG
81 TABLET ORAL DAILY
Refills: 0 | Status: DISCONTINUED | OUTPATIENT
Start: 2023-03-18 | End: 2023-03-21

## 2023-03-18 RX ORDER — VERAPAMIL HCL 240 MG
120 CAPSULE, EXTENDED RELEASE PELLETS 24 HR ORAL DAILY
Refills: 0 | Status: DISCONTINUED | OUTPATIENT
Start: 2023-03-18 | End: 2023-03-18

## 2023-03-18 RX ADMIN — Medication 100 MICROGRAM(S): at 06:10

## 2023-03-18 RX ADMIN — Medication 81 MILLIGRAM(S): at 12:33

## 2023-03-18 RX ADMIN — CARVEDILOL PHOSPHATE 6.25 MILLIGRAM(S): 80 CAPSULE, EXTENDED RELEASE ORAL at 17:35

## 2023-03-18 RX ADMIN — CARVEDILOL PHOSPHATE 6.25 MILLIGRAM(S): 80 CAPSULE, EXTENDED RELEASE ORAL at 06:10

## 2023-03-18 RX ADMIN — VALSARTAN 320 MILLIGRAM(S): 80 TABLET ORAL at 06:09

## 2023-03-18 NOTE — PHYSICAL THERAPY INITIAL EVALUATION ADULT - PERTINENT HX OF CURRENT PROBLEM, REHAB EVAL
87-year-old female with history of A-fib on Coumadin, hypertension presents today for weakness and low blood pressure.  Patient was recently in the hospital on March 7 and 8 for elevated blood pressure, and had her meds increased.  Patient took her usual doses today, however is feeling weak and had a borderline low blood pressure as per EMS.  No acute chest pain.  No shortness of breath. Pt does complain of some abdominal discomfort. Admitted w/ weakness and labile BP and palpitations.

## 2023-03-18 NOTE — CONSULT NOTE ADULT - ASSESSMENT
88y/o seen at [facility] telemetry  Sitting in chair, feeling better  History A-Fib, HTN, thyroid disease  S/P left knee surgery  S/P MARIA LUZ    Admitted for weakness and hypotension  Feeling better  INR-5.02  BNP-803    Plan:  - Continue Valsartan-320mg OD                  Coreg-6.25mg BID                  Verapamil SR-120mg OD  - Warfarin to be adjusted according by INR level  - If no contra-indication, maybe Warfarin should be replaced by Apixaban?  - Follow labs  - Spironolactone and Hydralazine on hold  - Follow labs

## 2023-03-18 NOTE — PHYSICAL THERAPY INITIAL EVALUATION ADULT - LEVEL OF INDEPENDENCE: SIT/STAND, REHAB EVAL
GENERAL:   PE: independent GENERAL: NAD  PE: Left knee + incision s/p left tkr, with moderate swelling, + effusion palpated, varicose veins in right le. SLT INTACT, DP/PT 2+, EHL/TA/GS 5/5. Decreased ROM secondary to pain. Rest of PE per medical clearance.

## 2023-03-18 NOTE — PHYSICAL THERAPY INITIAL EVALUATION ADULT - ADDITIONAL COMMENTS
Pt lives in a house w/ 2 steps/rail to enter.  Pt reports ambulates in house w/ no assistive device.  Since COVID, pt is primarily a household ambulator.

## 2023-03-18 NOTE — CONSULT NOTE ADULT - SUBJECTIVE AND OBJECTIVE BOX
CARDIOLOGY CONSULT NOTE    Patient is a 88y Female with a known history of :  HTN (hypertension) [I10]    Afib [I48.91]    Pyelonephritis [N12]    Weakness [R53.1]      HPI:  87-year-old female with history of A-fib on Coumadin, hypertension presents today for weakness and low blood pressure.  Patient was recently in the hospital on March 7 and 8 for elevated blood pressure, and had her meds increased.  Patient took her usual doses today, however is feeling weak and had a borderline low blood pressure as per EMS.  No acute chest pain.  No shortness of breath.  No fall or recent trauma.  No neck or back pain.  No known fever or chills.  No cough/URI.  Patient does complain of some abdominal discomfort.  No known dysuria or hematuria.  No aggravating or alleviating factors otherwise noted.  Patient previously vaccine for COVID, no recent exposures (17 Mar 2023 16:03)      REVIEW OF SYSTEMS:    CONSTITUTIONAL: No fever, weight loss, or fatigue  EYES: No eye pain, visual disturbances, or discharge  ENMT:  No difficulty hearing, tinnitus, vertigo; No sinus or throat pain  NECK: No pain or stiffness  BREASTS: No pain, masses, or nipple discharge  RESPIRATORY: No cough, wheezing, chills or hemoptysis; No shortness of breath  CARDIOVASCULAR: No chest pain, palpitations, dizziness, or leg swelling  GASTROINTESTINAL: No abdominal or epigastric pain. No nausea, vomiting, or hematemesis; No diarrhea or constipation. No melena or hematochezia.  GENITOURINARY: No dysuria, frequency, hematuria, or incontinence  NEUROLOGICAL: No headaches, memory loss, loss of strength, numbness, or tremors  SKIN: No itching, burning, rashes, or lesions   LYMPH NODES: No enlarged glands  ENDOCRINE: No heat or cold intolerance; No hair loss  MUSCULOSKELETAL: No joint pain or swelling; No muscle, back, or extremity pain  PSYCHIATRIC: No depression, anxiety, mood swings, or difficulty sleeping  HEME/LYMPH: No easy bruising, or bleeding gums  ALLERGY AND IMMUNOLOGIC: No hives or eczema    MEDICATIONS  (STANDING):  carvedilol 6.25 milliGRAM(s) Oral every 12 hours  influenza  Vaccine (HIGH DOSE) 0.7 milliLiter(s) IntraMuscular once  levothyroxine 100 MICROGram(s) Oral daily  valsartan 320 milliGRAM(s) Oral daily  verapamil  milliGRAM(s) Oral daily    MEDICATIONS  (PRN):      ALLERGIES: Depakote (Hepatotoxicity)  iodine (Other)  sulfa drugs (Seizure)  Topamax (Other)      FAMILY HISTORY:      Social History:  Alochol:   Smoking:   Drug Use:   Marital Status:     I&O's Detail    17 Mar 2023 07:01  -  18 Mar 2023 07:00  --------------------------------------------------------  IN:  Total IN: 0 mL    OUT:    Voided (mL): 1050 mL  Total OUT: 1050 mL    Total NET: -1050 mL          PHYSICAL EXAMINATION:  -----------------------------  T(C): 36.9 (03-18-23 @ 04:43), Max: 36.9 (03-18-23 @ 04:43)  HR: 60 (03-18-23 @ 04:43) (60 - 80)  BP: 129/74 (03-18-23 @ 04:43) (129/74 - 162/86)  RR: 18 (03-18-23 @ 04:43) (16 - 18)  SpO2: 98% (03-18-23 @ 04:43) (95% - 98%)  Wt(kg): --    03-17 @ 07:01  -  03-18 @ 07:00  --------------------------------------------------------  IN:  Total IN: 0 mL    OUT:    Voided (mL): 1050 mL  Total OUT: 1050 mL    Total NET: -1050 mL        Height (cm): 167.6 (03-17 @ 10:27)  Weight (kg): 68 (03-17 @ 10:27)  BMI (kg/m2): 24.2 (03-17 @ 10:27)  BSA (m2): 1.77 (03-17 @ 10:27)    Constitutional: well developed, normal appearance, well groomed, well nourished, no deformities and no acute distress.   Eyes: the conjunctiva exhibited no abnormalities and the eyelids demonstrated no xanthelasmas.   HEENT: normal oral mucosa, no oral pallor and no oral cyanosis.   Neck: normal jugular venous A waves present, normal jugular venous V waves present and no jugular venous powers A waves.   Pulmonary: no respiratory distress, normal respiratory rhythm and effort, no accessory muscle use and lungs were clear to auscultation bilaterally.   Cardiovascular: heart rate and rhythm were irreg/irreg, normal S1 and S2 and no murmur, gallop, rub, heave or thrill are present.   Musculoskeletal: the gait could not be assessed.   Extremities: no clubbing of the fingernails, no localized cyanosis, no petechial hemorrhages and no ischemic changes.   Skin: normal skin color and pigmentation, no rash, no venous stasis, no skin lesions, no skin ulcer and no xanthoma was observed.   Psychiatric: oriented to person, place, and time, the affect was normal, the mood was normal and not feeling anxious.     LABS:   --------  03-17    129<L>  |  99  |  12  ----------------------------<  129<H>  4.1   |  22  |  0.73    Ca    8.8      17 Mar 2023 11:00    TPro  6.0  /  Alb  3.2<L>  /  TBili  0.8  /  DBili  x   /  AST  20  /  ALT  30  /  AlkPhos  73  03-17                         15.2   8.22  )-----------( 615      ( 17 Mar 2023 11:00 )             48.3     PT/INR - ( 17 Mar 2023 20:40 )   PT: 59.7 sec;   INR: 5.02 ratio                       RADIOLOGY:  -----------------        ECG: A-Fib, PRWP V-V3, ST changes III, AVF-essentially unchanged

## 2023-03-19 LAB
ANION GAP SERPL CALC-SCNC: 5 MMOL/L — SIGNIFICANT CHANGE UP (ref 5–17)
BASOPHILS # BLD AUTO: 0.13 K/UL — SIGNIFICANT CHANGE UP (ref 0–0.2)
BASOPHILS NFR BLD AUTO: 1.5 % — SIGNIFICANT CHANGE UP (ref 0–2)
BUN SERPL-MCNC: 12 MG/DL — SIGNIFICANT CHANGE UP (ref 7–23)
CALCIUM SERPL-MCNC: 8.7 MG/DL — SIGNIFICANT CHANGE UP (ref 8.5–10.1)
CHLORIDE SERPL-SCNC: 103 MMOL/L — SIGNIFICANT CHANGE UP (ref 96–108)
CO2 SERPL-SCNC: 25 MMOL/L — SIGNIFICANT CHANGE UP (ref 22–31)
CREAT SERPL-MCNC: 0.63 MG/DL — SIGNIFICANT CHANGE UP (ref 0.5–1.3)
EGFR: 85 ML/MIN/1.73M2 — SIGNIFICANT CHANGE UP
EOSINOPHIL # BLD AUTO: 1.17 K/UL — HIGH (ref 0–0.5)
EOSINOPHIL NFR BLD AUTO: 13.9 % — HIGH (ref 0–6)
GLUCOSE SERPL-MCNC: 104 MG/DL — HIGH (ref 70–99)
HCT VFR BLD CALC: 49.8 % — HIGH (ref 34.5–45)
HGB BLD-MCNC: 16 G/DL — HIGH (ref 11.5–15.5)
IMM GRANULOCYTES NFR BLD AUTO: 0.2 % — SIGNIFICANT CHANGE UP (ref 0–0.9)
INR BLD: 2.36 RATIO — HIGH (ref 0.88–1.16)
LYMPHOCYTES # BLD AUTO: 1.63 K/UL — SIGNIFICANT CHANGE UP (ref 1–3.3)
LYMPHOCYTES # BLD AUTO: 19.4 % — SIGNIFICANT CHANGE UP (ref 13–44)
MCHC RBC-ENTMCNC: 23.9 PG — LOW (ref 27–34)
MCHC RBC-ENTMCNC: 32.1 GM/DL — SIGNIFICANT CHANGE UP (ref 32–36)
MCV RBC AUTO: 74.4 FL — LOW (ref 80–100)
MONOCYTES # BLD AUTO: 0.88 K/UL — SIGNIFICANT CHANGE UP (ref 0–0.9)
MONOCYTES NFR BLD AUTO: 10.5 % — SIGNIFICANT CHANGE UP (ref 2–14)
NEUTROPHILS # BLD AUTO: 4.56 K/UL — SIGNIFICANT CHANGE UP (ref 1.8–7.4)
NEUTROPHILS NFR BLD AUTO: 54.5 % — SIGNIFICANT CHANGE UP (ref 43–77)
NRBC # BLD: 0 /100 WBCS — SIGNIFICANT CHANGE UP (ref 0–0)
PLATELET # BLD AUTO: 572 K/UL — HIGH (ref 150–400)
POTASSIUM SERPL-MCNC: 4.2 MMOL/L — SIGNIFICANT CHANGE UP (ref 3.5–5.3)
POTASSIUM SERPL-SCNC: 4.2 MMOL/L — SIGNIFICANT CHANGE UP (ref 3.5–5.3)
PROTHROM AB SERPL-ACNC: 27.8 SEC — HIGH (ref 10.5–13.4)
RBC # BLD: 6.69 M/UL — HIGH (ref 3.8–5.2)
RBC # FLD: 20.1 % — HIGH (ref 10.3–14.5)
SODIUM SERPL-SCNC: 133 MMOL/L — LOW (ref 135–145)
WBC # BLD: 8.39 K/UL — SIGNIFICANT CHANGE UP (ref 3.8–10.5)
WBC # FLD AUTO: 8.39 K/UL — SIGNIFICANT CHANGE UP (ref 3.8–10.5)

## 2023-03-19 RX ORDER — AMLODIPINE BESYLATE 2.5 MG/1
5 TABLET ORAL DAILY
Refills: 0 | Status: DISCONTINUED | OUTPATIENT
Start: 2023-03-19 | End: 2023-03-20

## 2023-03-19 RX ORDER — NITROFURANTOIN MACROCRYSTAL 50 MG
100 CAPSULE ORAL
Refills: 0 | Status: DISCONTINUED | OUTPATIENT
Start: 2023-03-19 | End: 2023-03-21

## 2023-03-19 RX ORDER — PHENAZOPYRIDINE HCL 100 MG
100 TABLET ORAL EVERY 8 HOURS
Refills: 0 | Status: DISCONTINUED | OUTPATIENT
Start: 2023-03-19 | End: 2023-03-21

## 2023-03-19 RX ORDER — WARFARIN SODIUM 2.5 MG/1
3 TABLET ORAL ONCE
Refills: 0 | Status: COMPLETED | OUTPATIENT
Start: 2023-03-19 | End: 2023-03-19

## 2023-03-19 RX ADMIN — Medication 100 MILLIGRAM(S): at 17:15

## 2023-03-19 RX ADMIN — WARFARIN SODIUM 3 MILLIGRAM(S): 2.5 TABLET ORAL at 21:44

## 2023-03-19 RX ADMIN — CARVEDILOL PHOSPHATE 6.25 MILLIGRAM(S): 80 CAPSULE, EXTENDED RELEASE ORAL at 05:15

## 2023-03-19 RX ADMIN — CARVEDILOL PHOSPHATE 6.25 MILLIGRAM(S): 80 CAPSULE, EXTENDED RELEASE ORAL at 17:15

## 2023-03-19 RX ADMIN — Medication 81 MILLIGRAM(S): at 11:16

## 2023-03-19 RX ADMIN — Medication 100 MILLIGRAM(S): at 21:44

## 2023-03-19 RX ADMIN — VALSARTAN 320 MILLIGRAM(S): 80 TABLET ORAL at 05:13

## 2023-03-19 RX ADMIN — AMLODIPINE BESYLATE 5 MILLIGRAM(S): 2.5 TABLET ORAL at 17:15

## 2023-03-19 RX ADMIN — Medication 100 MILLIGRAM(S): at 16:06

## 2023-03-19 RX ADMIN — Medication 100 MICROGRAM(S): at 05:14

## 2023-03-19 NOTE — PROGRESS NOTE ADULT - PROBLEM SELECTOR PLAN 1
BP labile  patient unclear if palpitations coincided with hypotensive episode  dc'd spironolactone re hyponatremia as well  dc'd hydralazine as well (?eitology of her diarrhea)    3/18 am bp 92/50- will hold off verapamil for now as well  3/19- will add norvasc
BP labile  patient unclear if palpitations coincided with hypotensive episode  dc'd spironolactone re hyponatremia as well  dc'd hydralazine as well (?eitology of her diarrhea)    3/18 am bp 92/50- will hold off verapamil for now as well

## 2023-03-19 NOTE — PROGRESS NOTE ADULT - PROBLEM SELECTOR PLAN 3
doubt infectious process re bland urine, no fever, no WBC  ?perinephric stranding on CT  check bladder scan to rule out retention
doubt infectious process re bland urine, no fever, no WBC  ?perinephric stranding on CT  check bladder scan to rule out retention

## 2023-03-19 NOTE — PROGRESS NOTE ADULT - ASSESSMENT
88y/o seen at [facility] telemetry  Sitting in chair, feeling better  History A-Fib, HTN, thyroid disease  S/P left knee surgery  S/P MARIA LUZ    Admitted for weakness and hypotension  Feeling better  INR-5.02  BNP-803    3/19/23  Seen at [facility] telemetry  Lying flat, comfortably  Complaining of some palpitations  Monitor without any significant arrhythmia or increased heart rates    Plan:  - Continue Valsartan-320mg OD                  Coreg-6.25mg BID                  Verapamil SR-120mg OD  - Warfarin to be adjusted according by INR level  - If no contra-indication, maybe Warfarin should be replaced by Apixaban?  - Follow labs  - Spironolactone and Hydralazine on hold  - Follow labs

## 2023-03-20 ENCOUNTER — TRANSCRIPTION ENCOUNTER (OUTPATIENT)
Age: 88
End: 2023-03-20

## 2023-03-20 LAB
-  AMIKACIN: SIGNIFICANT CHANGE UP
-  AMOXICILLIN/CLAVULANIC ACID: SIGNIFICANT CHANGE UP
-  AMPICILLIN/SULBACTAM: SIGNIFICANT CHANGE UP
-  AMPICILLIN: SIGNIFICANT CHANGE UP
-  AZTREONAM: SIGNIFICANT CHANGE UP
-  CEFAZOLIN: SIGNIFICANT CHANGE UP
-  CEFEPIME: SIGNIFICANT CHANGE UP
-  CEFOXITIN: SIGNIFICANT CHANGE UP
-  CEFTRIAXONE: SIGNIFICANT CHANGE UP
-  CEFUROXIME: SIGNIFICANT CHANGE UP
-  CIPROFLOXACIN: SIGNIFICANT CHANGE UP
-  ERTAPENEM: SIGNIFICANT CHANGE UP
-  GENTAMICIN: SIGNIFICANT CHANGE UP
-  IMIPENEM: SIGNIFICANT CHANGE UP
-  LEVOFLOXACIN: SIGNIFICANT CHANGE UP
-  MEROPENEM: SIGNIFICANT CHANGE UP
-  NITROFURANTOIN: SIGNIFICANT CHANGE UP
-  PIPERACILLIN/TAZOBACTAM: SIGNIFICANT CHANGE UP
-  TOBRAMYCIN: SIGNIFICANT CHANGE UP
-  TRIMETHOPRIM/SULFAMETHOXAZOLE: SIGNIFICANT CHANGE UP
CULTURE RESULTS: SIGNIFICANT CHANGE UP
INR BLD: 1.51 RATIO — HIGH (ref 0.88–1.16)
METHOD TYPE: SIGNIFICANT CHANGE UP
ORGANISM # SPEC MICROSCOPIC CNT: SIGNIFICANT CHANGE UP
ORGANISM # SPEC MICROSCOPIC CNT: SIGNIFICANT CHANGE UP
PROTHROM AB SERPL-ACNC: 17.7 SEC — HIGH (ref 10.5–13.4)
SPECIMEN SOURCE: SIGNIFICANT CHANGE UP

## 2023-03-20 RX ORDER — WARFARIN SODIUM 2.5 MG/1
4 TABLET ORAL ONCE
Refills: 0 | Status: COMPLETED | OUTPATIENT
Start: 2023-03-20 | End: 2023-03-20

## 2023-03-20 RX ORDER — AMLODIPINE BESYLATE 2.5 MG/1
10 TABLET ORAL AT BEDTIME
Refills: 0 | Status: DISCONTINUED | OUTPATIENT
Start: 2023-03-20 | End: 2023-03-20

## 2023-03-20 RX ORDER — AMLODIPINE BESYLATE 2.5 MG/1
10 TABLET ORAL EVERY 24 HOURS
Refills: 0 | Status: DISCONTINUED | OUTPATIENT
Start: 2023-03-21 | End: 2023-03-21

## 2023-03-20 RX ORDER — AMLODIPINE BESYLATE 2.5 MG/1
5 TABLET ORAL ONCE
Refills: 0 | Status: COMPLETED | OUTPATIENT
Start: 2023-03-20 | End: 2023-03-20

## 2023-03-20 RX ADMIN — Medication 100 MILLIGRAM(S): at 05:42

## 2023-03-20 RX ADMIN — CARVEDILOL PHOSPHATE 6.25 MILLIGRAM(S): 80 CAPSULE, EXTENDED RELEASE ORAL at 17:16

## 2023-03-20 RX ADMIN — Medication 100 MILLIGRAM(S): at 21:35

## 2023-03-20 RX ADMIN — Medication 100 MILLIGRAM(S): at 17:16

## 2023-03-20 RX ADMIN — WARFARIN SODIUM 4 MILLIGRAM(S): 2.5 TABLET ORAL at 21:36

## 2023-03-20 RX ADMIN — Medication 100 MILLIGRAM(S): at 13:33

## 2023-03-20 RX ADMIN — Medication 100 MILLIGRAM(S): at 05:36

## 2023-03-20 RX ADMIN — AMLODIPINE BESYLATE 5 MILLIGRAM(S): 2.5 TABLET ORAL at 05:37

## 2023-03-20 RX ADMIN — Medication 100 MICROGRAM(S): at 05:37

## 2023-03-20 RX ADMIN — Medication 81 MILLIGRAM(S): at 11:38

## 2023-03-20 RX ADMIN — VALSARTAN 320 MILLIGRAM(S): 80 TABLET ORAL at 05:36

## 2023-03-20 RX ADMIN — AMLODIPINE BESYLATE 5 MILLIGRAM(S): 2.5 TABLET ORAL at 17:17

## 2023-03-20 RX ADMIN — CARVEDILOL PHOSPHATE 6.25 MILLIGRAM(S): 80 CAPSULE, EXTENDED RELEASE ORAL at 05:38

## 2023-03-20 NOTE — CARE COORDINATION ASSESSMENT. - NSCAREPROVIDERS_GEN_ALL_CORE_FT
CARE PROVIDERS:  Accepting Physician: Delmy Morris  Administration: Opal Sweeney  Administration: Daren Samano  Administration: Roberto Miranda  Admitting: Delmy Morris  Attending: Delmy Morris  Case Management: Ajith David  Consultant: Tracie Miller  Covering Team: Perlman, Daryl  ED Attending: Scott Cho  ED Nurse: Zhanna Alexander  Nurse: Rachael Fuller  Nurse: Héctor Morales  Nurse: Brittany Agarwal  Occupational Therapy: Mague Villalobos  Ordered: ADM, User  Ordered: Davion Galeano  Ordered: ServiceAccount, SCMMLM  Outpatient Provider: Davion Galeano  Outpatient Provider: Mesha Cha  Outpatient Provider: Alexis Verduzco  Override: Roxanne Finley  PCA/Nursing Assistant: Nahomi Hill  Registered Dietitian: Lindsay Aguilera  : Madison Alcantar

## 2023-03-20 NOTE — CARE COORDINATION ASSESSMENT. - CURRENT MENTAL STATUS/COGNITIVE FUNCTIONING
Met patient at bedside.  Explained role of CM, verbalized understanding. Pt was made aware a CM will remain available through hospitalization.  Contact information given in discharge/ transitions resource folder. Pt gave consent for CM to speak with son, Be, present./alert/oriented to person/oriented to place/oriented to time/oriented to situation/recall memory is intact

## 2023-03-20 NOTE — CARE COORDINATION ASSESSMENT. - NS SW [facility] DISCIPLINE
has [facility] for BP checks, wishes to continue with them and if needed add additional services to them./skilled nursing

## 2023-03-20 NOTE — CARE COORDINATION ASSESSMENT. - NSDCPLANSERVICES_GEN_ALL_CORE
anticipate return to home with [facility] servcies.  family will  transport.  Awaiting PT eval/Home Care anticipate return to home with [facility] servcies.  family will  transport.  Seen by PT, no skilled needs anticipated/Home Care

## 2023-03-20 NOTE — PATIENT CHOICE NOTE. - NSPTCHOICENOTES_GEN_ALL_CORE
Wishes to resume with [facility], is aware that if they do not accept for [facility], may need another choice for [facility]

## 2023-03-20 NOTE — PATIENT CHOICE NOTE. - NSPTCHOICESTATE_GEN_ALL_CORE

## 2023-03-20 NOTE — PROGRESS NOTE ADULT - ASSESSMENT
87F with h/o A-Fib, HTN, thyroid disease, S/P left knee surgery, S/P MARIA LUZ a/w weakness and hypotension clinically improving. Positive palpitations    INR-5.02  BNP-803    Complaining of some palpitations  Monitor without any significant arrhythmia or increased heart rates    Suggest:  1. HTN  - Continue Valsartan-320mg OD  - c/w Coreg-6.25mg BID  - c/w Verapamil SR-120mg OD    2. AF  - c/w Warfarin to be adjusted according by INR level  - If no contra-indication, maybe Warfarin should be replaced by Apixaban?    3. Follow labs  - Spironolactone and Hydralazine on hold  - Follow labs

## 2023-03-20 NOTE — PROGRESS NOTE ADULT - ASSESSMENT
87 yr old female with weakness and labile BP and palpitations    Problem/Plan - 1:  ·  Problem: HTN (hypertension).   ·  Plan: BP labile  patient unclear if palpitations coincided with hypotensive episode  dc'd spironolactone re hyponatremia as well  dc'd hydralazine as well (?eitology of her diarrhea)    3/18 am bp 92/50- will hold off verapamil for now as well  3/19- will add norvasc.  3/20 increased norvasc to 10 daily   Problem/Plan - 2:  ·  Problem: Afib.   ·  Plan: dose coumadin  cont tess agents.    Problem/Plan - 3:  ·  Problem: Pyelonephritis.   ·  Plan: doubt infectious process re bland urine, no fever, no WBC  ?perinephric stranding on CT  check bladder scan to rule out retention.    Problem/Plan - 4:  ·  Problem: Weakness.   ·  Plan: multifactorial etiology  PT eval.

## 2023-03-20 NOTE — CAREGIVER ENGAGEMENT NOTE - CAREGIVER EDUCATION HOME CARE SERVICES - FREE TEXT
Has [facility], CM will send to [facility] for transition to home when medically stable,

## 2023-03-20 NOTE — DISCHARGE NOTE NURSING/CASE MANAGEMENT/SOCIAL WORK - NSDCFUADDAPPT_GEN_ALL_CORE_FT
Call [phone] with your insurance information and they will assist you in finding a [facility] affiliated MD ( including specialists) in your area

## 2023-03-20 NOTE — DISCHARGE NOTE NURSING/CASE MANAGEMENT/SOCIAL WORK - PATIENT PORTAL LINK FT
You can access the FollowMyHealth Patient Portal offered by [facility] by registering at the following website: [email or web address]. By joining [facility]’s FollowMyHealth portal, you will also be able to view your health information using other applications (apps) compatible with our system.

## 2023-03-21 ENCOUNTER — TRANSCRIPTION ENCOUNTER (OUTPATIENT)
Age: 88
End: 2023-03-21

## 2023-03-21 VITALS
TEMPERATURE: 98 F | SYSTOLIC BLOOD PRESSURE: 131 MMHG | HEART RATE: 80 BPM | RESPIRATION RATE: 15 BRPM | DIASTOLIC BLOOD PRESSURE: 81 MMHG | OXYGEN SATURATION: 96 %

## 2023-03-21 LAB
ALBUMIN SERPL ELPH-MCNC: 3 G/DL — LOW (ref 3.3–5)
ALP SERPL-CCNC: 72 U/L — SIGNIFICANT CHANGE UP (ref 40–120)
ALT FLD-CCNC: 36 U/L — SIGNIFICANT CHANGE UP (ref 12–78)
ANION GAP SERPL CALC-SCNC: 6 MMOL/L — SIGNIFICANT CHANGE UP (ref 5–17)
AST SERPL-CCNC: 29 U/L — SIGNIFICANT CHANGE UP (ref 15–37)
BILIRUB SERPL-MCNC: 0.6 MG/DL — SIGNIFICANT CHANGE UP (ref 0.2–1.2)
BUN SERPL-MCNC: 12 MG/DL — SIGNIFICANT CHANGE UP (ref 7–23)
CALCIUM SERPL-MCNC: 8.9 MG/DL — SIGNIFICANT CHANGE UP (ref 8.5–10.1)
CHLORIDE SERPL-SCNC: 103 MMOL/L — SIGNIFICANT CHANGE UP (ref 96–108)
CO2 SERPL-SCNC: 24 MMOL/L — SIGNIFICANT CHANGE UP (ref 22–31)
CREAT SERPL-MCNC: 0.65 MG/DL — SIGNIFICANT CHANGE UP (ref 0.5–1.3)
EGFR: 85 ML/MIN/1.73M2 — SIGNIFICANT CHANGE UP
GLUCOSE SERPL-MCNC: 97 MG/DL — SIGNIFICANT CHANGE UP (ref 70–99)
HCT VFR BLD CALC: 48.4 % — HIGH (ref 34.5–45)
HGB BLD-MCNC: 15.3 G/DL — SIGNIFICANT CHANGE UP (ref 11.5–15.5)
INR BLD: 1.44 RATIO — HIGH (ref 0.88–1.16)
MCHC RBC-ENTMCNC: 23.9 PG — LOW (ref 27–34)
MCHC RBC-ENTMCNC: 31.6 GM/DL — LOW (ref 32–36)
MCV RBC AUTO: 75.6 FL — LOW (ref 80–100)
NRBC # BLD: 0 /100 WBCS — SIGNIFICANT CHANGE UP (ref 0–0)
PLATELET # BLD AUTO: 591 K/UL — HIGH (ref 150–400)
POTASSIUM SERPL-MCNC: 3.9 MMOL/L — SIGNIFICANT CHANGE UP (ref 3.5–5.3)
POTASSIUM SERPL-SCNC: 3.9 MMOL/L — SIGNIFICANT CHANGE UP (ref 3.5–5.3)
PROT SERPL-MCNC: 5.9 G/DL — LOW (ref 6–8.3)
PROTHROM AB SERPL-ACNC: 16.9 SEC — HIGH (ref 10.5–13.4)
RBC # BLD: 6.4 M/UL — HIGH (ref 3.8–5.2)
RBC # FLD: 20.3 % — HIGH (ref 10.3–14.5)
SODIUM SERPL-SCNC: 133 MMOL/L — LOW (ref 135–145)
WBC # BLD: 9.22 K/UL — SIGNIFICANT CHANGE UP (ref 3.8–10.5)
WBC # FLD AUTO: 9.22 K/UL — SIGNIFICANT CHANGE UP (ref 3.8–10.5)

## 2023-03-21 PROCEDURE — 82550 ASSAY OF CK (CPK): CPT

## 2023-03-21 PROCEDURE — 84484 ASSAY OF TROPONIN QUANT: CPT

## 2023-03-21 PROCEDURE — 83835 ASSAY OF METANEPHRINES: CPT

## 2023-03-21 PROCEDURE — 96360 HYDRATION IV INFUSION INIT: CPT

## 2023-03-21 PROCEDURE — 87040 BLOOD CULTURE FOR BACTERIA: CPT

## 2023-03-21 PROCEDURE — 83880 ASSAY OF NATRIURETIC PEPTIDE: CPT

## 2023-03-21 PROCEDURE — 36415 COLL VENOUS BLD VENIPUNCTURE: CPT

## 2023-03-21 PROCEDURE — 0225U NFCT DS DNA&RNA 21 SARSCOV2: CPT

## 2023-03-21 PROCEDURE — 80053 COMPREHEN METABOLIC PANEL: CPT

## 2023-03-21 PROCEDURE — 85025 COMPLETE CBC W/AUTO DIFF WBC: CPT

## 2023-03-21 PROCEDURE — 74176 CT ABD & PELVIS W/O CONTRAST: CPT | Mod: MA

## 2023-03-21 PROCEDURE — 80048 BASIC METABOLIC PNL TOTAL CA: CPT

## 2023-03-21 PROCEDURE — 71045 X-RAY EXAM CHEST 1 VIEW: CPT

## 2023-03-21 PROCEDURE — 96361 HYDRATE IV INFUSION ADD-ON: CPT

## 2023-03-21 PROCEDURE — 85610 PROTHROMBIN TIME: CPT

## 2023-03-21 PROCEDURE — 81001 URINALYSIS AUTO W/SCOPE: CPT

## 2023-03-21 PROCEDURE — 85027 COMPLETE CBC AUTOMATED: CPT

## 2023-03-21 PROCEDURE — 84443 ASSAY THYROID STIM HORMONE: CPT

## 2023-03-21 PROCEDURE — 97162 PT EVAL MOD COMPLEX 30 MIN: CPT

## 2023-03-21 PROCEDURE — 70450 CT HEAD/BRAIN W/O DYE: CPT | Mod: MA

## 2023-03-21 PROCEDURE — 87186 SC STD MICRODIL/AGAR DIL: CPT

## 2023-03-21 PROCEDURE — 83605 ASSAY OF LACTIC ACID: CPT

## 2023-03-21 PROCEDURE — 87086 URINE CULTURE/COLONY COUNT: CPT

## 2023-03-21 PROCEDURE — 99285 EMERGENCY DEPT VISIT HI MDM: CPT | Mod: 25

## 2023-03-21 PROCEDURE — 93005 ELECTROCARDIOGRAM TRACING: CPT

## 2023-03-21 RX ORDER — AMLODIPINE BESYLATE 2.5 MG/1
1 TABLET ORAL
Qty: 30 | Refills: 0
Start: 2023-03-21 | End: 2023-04-19

## 2023-03-21 RX ORDER — VERAPAMIL HCL 240 MG
1 CAPSULE, EXTENDED RELEASE PELLETS 24 HR ORAL
Qty: 0 | Refills: 0 | DISCHARGE

## 2023-03-21 RX ORDER — HYDRALAZINE HCL 50 MG
1 TABLET ORAL
Qty: 0 | Refills: 0 | DISCHARGE

## 2023-03-21 RX ORDER — ASPIRIN/CALCIUM CARB/MAGNESIUM 324 MG
1 TABLET ORAL
Qty: 30 | Refills: 0
Start: 2023-03-21 | End: 2023-04-19

## 2023-03-21 RX ADMIN — Medication 81 MILLIGRAM(S): at 12:04

## 2023-03-21 RX ADMIN — VALSARTAN 320 MILLIGRAM(S): 80 TABLET ORAL at 05:34

## 2023-03-21 RX ADMIN — Medication 100 MICROGRAM(S): at 05:35

## 2023-03-21 RX ADMIN — CARVEDILOL PHOSPHATE 6.25 MILLIGRAM(S): 80 CAPSULE, EXTENDED RELEASE ORAL at 05:35

## 2023-03-21 RX ADMIN — Medication 100 MILLIGRAM(S): at 05:35

## 2023-03-21 RX ADMIN — Medication 100 MILLIGRAM(S): at 05:34

## 2023-03-21 NOTE — PROGRESS NOTE ADULT - SUBJECTIVE AND OBJECTIVE BOX
[facility] Cardiology Progress Note [phone] (Dr. Galeano, Starla, Felipe, Jarvis)    CHIEF COMPLAINT: Patient is a 88y old  Female who presents with a chief complaint of weakness, hypotension (20 Mar 2023 13:17)      Follow Up Today: The patient denies any chest discomfort or shortness of breath.    HPI:  87-year-old female with history of A-fib on Coumadin, hypertension presents today for weakness and low blood pressure.  Patient was recently in the hospital on March 7 and 8 for elevated blood pressure, and had her meds increased.  Patient took her usual doses today, however is feeling weak and had a borderline low blood pressure as per EMS.  No acute chest pain.  No shortness of breath.  No fall or recent trauma.  No neck or back pain.  No known fever or chills.  No cough/URI.  Patient does complain of some abdominal discomfort.  No known dysuria or hematuria.  No aggravating or alleviating factors otherwise noted.  Patient previously vaccine for COVID, no recent exposures (17 Mar 2023 16:03)      PAST MEDICAL & SURGICAL HISTORY:  Atrial fibrillation      Hypothyroid      Status post hysterectomy  1972      History of arthroscopy  left knee      History of colonoscopy          MEDICATIONS  (STANDING):  amLODIPine   Tablet 10 milliGRAM(s) Oral every 24 hours  aspirin enteric coated 81 milliGRAM(s) Oral daily  carvedilol 6.25 milliGRAM(s) Oral every 12 hours  influenza  Vaccine (HIGH DOSE) 0.7 milliLiter(s) IntraMuscular once  levothyroxine 100 MICROGram(s) Oral daily  nitrofurantoin monohydrate/macrocrystals (MACROBID) 100 milliGRAM(s) Oral two times a day  phenazopyridine 100 milliGRAM(s) Oral every 8 hours  valsartan 320 milliGRAM(s) Oral daily    MEDICATIONS  (PRN):      Allergies    Depakote (Hepatotoxicity)  iodine (Other)  sulfa drugs (Seizure)  Topamax (Other)    Intolerances        REVIEW OF SYSTEMS:    All other review of systems is negative unless indicated above    Vital Signs Last 24 Hrs  T(C): 37 (21 Mar 2023 04:47), Max: 37 (21 Mar 2023 04:47)  T(F): 98.6 (21 Mar 2023 04:47), Max: 98.6 (21 Mar 2023 04:47)  HR: 73 (21 Mar 2023 04:47) (64 - 75)  BP: 143/83 (21 Mar 2023 04:47) (117/70 - 143/83)  BP(mean): --  RR: 18 (21 Mar 2023 04:47) (15 - 18)  SpO2: 95% (21 Mar 2023 04:47) (94% - 95%)    Parameters below as of 21 Mar 2023 04:47  Patient On (Oxygen Delivery Method): room air        I&O's Summary    20 Mar 2023 07:01  -  21 Mar 2023 06:59  --------------------------------------------------------  IN: 0 mL / OUT: 400 mL / NET: -400 mL        PHYSICAL EXAM:    Constitutional: NAD, awake and alert, well-developed  Eyes:  EOMI,  Pupils round, No oral cyanosis.  HEENT: No exudate or erythema  Pulmonary: Non-labored, breath sounds are clear bilaterally, No wheezing, rales or rhonchi  Cardiovascular: Regular, S1 and S2, No murmurs, rubs, gallops oir clicks  Gastrointestinal: Bowel Sounds present, soft, nontender.   Ext: No significant LE edema  Neurological: Alert, no gross focal motor deficits  Skin: No rashes.  Psych:  Mood & affect appropriate    LABS: All Labs Reviewed:                        16.0   8.39  )-----------( 572      ( 19 Mar 2023 08:59 )             49.8                         15.8   8.68  )-----------( 637      ( 18 Mar 2023 09:00 )             50.5     19 Mar 2023 08:59    133    |  103    |  12     ----------------------------<  104    4.2     |  25     |  0.63   18 Mar 2023 09:00    131    |  104    |  8      ----------------------------<  119    4.2     |  21     |  0.71     Ca    8.7        19 Mar 2023 08:59  Ca    8.7        18 Mar 2023 09:00    TPro  6.2    /  Alb  3.2    /  TBili  1.1    /  DBili  x      /  AST  16     /  ALT  27     /  AlkPhos  84     18 Mar 2023 09:00    PT/INR - ( 20 Mar 2023 07:40 )   PT: 17.7 sec;   INR: 1.51 ratio               Blood Culture: Organism Escherichia coli  Gram Stain Blood -- Gram Stain --  Specimen Source Clean Catch Clean Catch (Midstream)  Culture-Blood --    Organism --  Gram Stain Blood -- Gram Stain --  Specimen Source .Blood Blood-Peripheral  Culture-Blood --    Organism --  Gram Stain Blood -- Gram Stain --  Specimen Source .Blood Blood-Peripheral  Culture-Blood --        03-18 @ 09:00  TSH: 2.87      RADIOLOGY/EKG:    Attending Attestation:   20 minutes spent on total encounter; more than 50% of the visit was spent counseling and/or coordinating care by the attending physician.     ASSESSMENT AND PLAN
Patient is a 88y old  Female who presents with a chief complaint of weakness, hypotension (19 Mar 2023 07:23)  no further diarrhea or weakness  complains of persistent dysuria      INTERVAL HPI/OVERNIGHT EVENTS:  T(C): 36.5 (03-19-23 @ 12:45), Max: 36.6 (03-18-23 @ 22:10)  HR: 72 (03-19-23 @ 12:45) (72 - 77)  BP: 175/83 (03-19-23 @ 12:45) (131/78 - 175/83)  RR: 18 (03-19-23 @ 12:45) (18 - 18)  SpO2: 97% (03-19-23 @ 12:45) (95% - 97%)  Wt(kg): --  I&O's Summary    18 Mar 2023 07:01  -  19 Mar 2023 07:00  --------------------------------------------------------  IN: 0 mL / OUT: 1540 mL / NET: -1540 mL        LABS:                        16.0   8.39  )-----------( 572      ( 19 Mar 2023 08:59 )             49.8     03-19    133<L>  |  103  |  12  ----------------------------<  104<H>  4.2   |  25  |  0.63    Ca    8.7      19 Mar 2023 08:59    TPro  6.2  /  Alb  3.2<L>  /  TBili  1.1  /  DBili  x   /  AST  16  /  ALT  27  /  AlkPhos  84  03-18    PT/INR - ( 19 Mar 2023 08:59 )   PT: 27.8 sec;   INR: 2.36 ratio             CAPILLARY BLOOD GLUCOSE                MEDICATIONS  (STANDING):  amLODIPine   Tablet 5 milliGRAM(s) Oral daily  aspirin enteric coated 81 milliGRAM(s) Oral daily  carvedilol 6.25 milliGRAM(s) Oral every 12 hours  influenza  Vaccine (HIGH DOSE) 0.7 milliLiter(s) IntraMuscular once  levothyroxine 100 MICROGram(s) Oral daily  nitrofurantoin monohydrate/macrocrystals (MACROBID) 100 milliGRAM(s) Oral two times a day  phenazopyridine 100 milliGRAM(s) Oral every 8 hours  valsartan 320 milliGRAM(s) Oral daily  warfarin 3 milliGRAM(s) Oral once    MEDICATIONS  (PRN):      REVIEW OF SYSTEMS:  CONSTITUTIONAL: No fever, weight loss, or fatigue  EYES: No eye pain, visual disturbances, or discharge  ENMT:  No difficulty hearing, tinnitus, vertigo; No sinus or throat pain  NECK: No pain or stiffness  RESPIRATORY: No cough, wheezing, chills or hemoptysis; No shortness of breath  CARDIOVASCULAR: No chest pain, palpitations, dizziness, or leg swelling  GASTROINTESTINAL: No abdominal or epigastric pain. No nausea, vomiting, or hematemesis; No diarrhea or constipation. No melena or hematochezia.  GENITOURINARY: dysuria and frequency  NEUROLOGICAL: No headaches, memory loss, loss of strength, numbness, or tremors  SKIN: No itching, burning, rashes, or lesions   LYMPH NODES: No enlarged glands  ENDOCRINE: No heat or cold intolerance; No hair loss  MUSCULOSKELETAL: No joint pain or swelling; No muscle, back, or extremity pain  PSYCHIATRIC: No depression, anxiety, mood swings, or difficulty sleeping  HEME/LYMPH: No easy bruising, or bleeding gums  ALLERY AND IMMUNOLOGIC: No hives or eczema    RADIOLOGY & ADDITIONAL TESTS:    Imaging Personally Reviewed:  [ ] YES  [ ] NO    Consultant(s) Notes Reviewed:  [ ] YES  [ ] NO    PHYSICAL EXAM:  GENERAL: NAD, well-groomed, well-developed  HEAD:  Atraumatic, Normocephalic  EYES: EOMI, PERRLA, conjunctiva and sclera clear  ENMT: No tonsillar erythema, exudates, or enlargement; Moist mucous membranes, Good dentition, No lesions  NECK: Supple, No JVD, Normal thyroid  NERVOUS SYSTEM:  Alert & Oriented X3, Good concentration; Motor Strength 5/5 B/L upper and lower extremities; DTRs 2+ intact and symmetric  CHEST/LUNG: Clear to percussion bilaterally; No rales, rhonchi, wheezing, or rubs  HEART: Regular rate and rhythm; No murmurs, rubs, or gallops  ABDOMEN: Soft, Nontender, Nondistended; Bowel sounds present  EXTREMITIES:  2+ Peripheral Pulses, No clubbing, cyanosis, or edema  LYMPH: No lymphadenopathy noted  SKIN: No rashes or lesions    Care Discussed with Consultants/Other Providers [ ] YES  [ ] NO
Patient is a 88y Female with a known history of :  HTN (hypertension) [I10]    Afib [I48.91]    Pyelonephritis [N12]    Weakness [R53.1]      HPI:  87-year-old female with history of A-fib on Coumadin, hypertension presents today for weakness and low blood pressure.  Patient was recently in the hospital on March 7 and 8 for elevated blood pressure, and had her meds increased.  Patient took her usual doses today, however is feeling weak and had a borderline low blood pressure as per EMS.  No acute chest pain.  No shortness of breath.  No fall or recent trauma.  No neck or back pain.  No known fever or chills.  No cough/URI.  Patient does complain of some abdominal discomfort.  No known dysuria or hematuria.  No aggravating or alleviating factors otherwise noted.  Patient previously vaccine for COVID, no recent exposures (17 Mar 2023 16:03)      REVIEW OF SYSTEMS:    CONSTITUTIONAL: No fever, weight loss, or fatigue  EYES: No eye pain, visual disturbances, or discharge  ENMT:  No difficulty hearing, tinnitus, vertigo; No sinus or throat pain  NECK: No pain or stiffness  BREASTS: No pain, masses, or nipple discharge  RESPIRATORY: No cough, wheezing, chills or hemoptysis; No shortness of breath  CARDIOVASCULAR: No chest pain, palpitations, dizziness, or leg swelling  GASTROINTESTINAL: No abdominal or epigastric pain. No nausea, vomiting, or hematemesis; No diarrhea or constipation. No melena or hematochezia.  GENITOURINARY: No dysuria, frequency, hematuria, or incontinence  NEUROLOGICAL: No headaches, memory loss, loss of strength, numbness, or tremors  SKIN: No itching, burning, rashes, or lesions   LYMPH NODES: No enlarged glands  ENDOCRINE: No heat or cold intolerance; No hair loss  MUSCULOSKELETAL: No joint pain or swelling; No muscle, back, or extremity pain  PSYCHIATRIC: No depression, anxiety, mood swings, or difficulty sleeping  HEME/LYMPH: No easy bruising, or bleeding gums  ALLERGY AND IMMUNOLOGIC: No hives or eczema    MEDICATIONS  (STANDING):  aspirin enteric coated 81 milliGRAM(s) Oral daily  carvedilol 6.25 milliGRAM(s) Oral every 12 hours  influenza  Vaccine (HIGH DOSE) 0.7 milliLiter(s) IntraMuscular once  levothyroxine 100 MICROGram(s) Oral daily  valsartan 320 milliGRAM(s) Oral daily    MEDICATIONS  (PRN):      ALLERGIES: Depakote (Hepatotoxicity)  iodine (Other)  sulfa drugs (Seizure)  Topamax (Other)      FAMILY HISTORY:      Social history:  Alochol:   Smoking:   Drug Use:   Marital Status:     PHYSICAL EXAMINATION:  -----------------------------  T(C): 36.6 (03-19-23 @ 04:15), Max: 36.6 (03-18-23 @ 22:10)  HR: 73 (03-19-23 @ 04:15) (73 - 82)  BP: 166/78 (03-19-23 @ 04:15) (92/59 - 168/92)  RR: 18 (03-19-23 @ 04:15) (18 - 18)  SpO2: 96% (03-19-23 @ 04:15) (96% - 96%)  Wt(kg): --    03-18 @ 07:01  -  03-19 @ 07:00  --------------------------------------------------------  IN:  Total IN: 0 mL    OUT:    Voided (mL): 1540 mL  Total OUT: 1540 mL    Total NET: -1540 mL            Constitutional: well developed, normal appearance, well groomed, well nourished, no deformities and no acute distress.   Eyes: the conjunctiva exhibited no abnormalities and the eyelids demonstrated no xanthelasmas.   HEENT: normal oral mucosa, no oral pallor and no oral cyanosis.   Neck: normal jugular venous A waves present, normal jugular venous V waves present and no jugular venous powers A waves.   Pulmonary: no respiratory distress, normal respiratory rhythm and effort, no accessory muscle use and lungs were clear to auscultation bilaterally. Anteriorly  Cardiovascular: heart rate and rhythm were irrreg/irreg, normal S1 and S2 and no murmur, gallop, rub, heave or thrill are present.   Musculoskeletal: the gait could not be assessed.   Extremities: no clubbing of the fingernails, no localized cyanosis, no petechial hemorrhages and no ischemic changes.   Skin: normal skin color and pigmentation, no rash, no venous stasis, no skin lesions, no skin ulcer and no xanthoma was observed.   Psychiatric: oriented to person, place, and time, the affect was normal, the mood was normal and not feeling anxious.     LABS:   --------  03-18    131<L>  |  104  |  8   ----------------------------<  119<H>  4.2   |  21<L>  |  0.71    Ca    8.7      18 Mar 2023 09:00    TPro  6.2  /  Alb  3.2<L>  /  TBili  1.1  /  DBili  x   /  AST  16  /  ALT  27  /  AlkPhos  84  03-18                         15.8   8.68  )-----------( 637      ( 18 Mar 2023 09:00 )             50.5     PT/INR - ( 18 Mar 2023 09:00 )   PT: 56.9 sec;   INR: 4.79 ratio                 Culture Results:   10,000 - 49,000 CFU/mL Escherichia coli (03-17 @ 13:11)  Culture Results:   No growth to date. (03-17 @ 11:00)  Culture Results:   No growth to date. (03-17 @ 10:55)    03-17 @ 13:11    Organism --   Gram Stain Blood -- Gram Stain --  Specimen Source Clean Catch Clean Catch (Midstream)  Culture-Blood --    03-17 @ 11:00    Organism --   Gram Stain Blood -- Gram Stain --  Specimen Source .Blood Blood-Peripheral  Culture-Blood --    03-17 @ 10:55    Organism --   Gram Stain Blood -- Gram Stain --  Specimen Source .Blood Blood-Peripheral  Culture-Blood --        Radiology:    
[facility] Cardiology Progress Note [phone] (Dr. Galeano, Starla, Felipe, Jarvis)    CHIEF COMPLAINT: Patient is a 88y old  Female who presents with a chief complaint of weakness, hypotension (19 Mar 2023 13:33)      Follow Up Today: The patient denies any chest discomfort or shortness of breath.    HPI:  87-year-old female with history of A-fib on Coumadin, hypertension presents today for weakness and low blood pressure.  Patient was recently in the hospital on March 7 and 8 for elevated blood pressure, and had her meds increased.  Patient took her usual doses today, however is feeling weak and had a borderline low blood pressure as per EMS.  No acute chest pain.  No shortness of breath.  No fall or recent trauma.  No neck or back pain.  No known fever or chills.  No cough/URI.  Patient does complain of some abdominal discomfort.  No known dysuria or hematuria.  No aggravating or alleviating factors otherwise noted.  Patient previously vaccine for COVID, no recent exposures (17 Mar 2023 16:03)      PAST MEDICAL & SURGICAL HISTORY:  Atrial fibrillation      Hypothyroid      Status post hysterectomy  1972      History of arthroscopy  left knee      History of colonoscopy          MEDICATIONS  (STANDING):  amLODIPine   Tablet 5 milliGRAM(s) Oral daily  aspirin enteric coated 81 milliGRAM(s) Oral daily  carvedilol 6.25 milliGRAM(s) Oral every 12 hours  influenza  Vaccine (HIGH DOSE) 0.7 milliLiter(s) IntraMuscular once  levothyroxine 100 MICROGram(s) Oral daily  nitrofurantoin monohydrate/macrocrystals (MACROBID) 100 milliGRAM(s) Oral two times a day  phenazopyridine 100 milliGRAM(s) Oral every 8 hours  valsartan 320 milliGRAM(s) Oral daily    MEDICATIONS  (PRN):      Allergies    Depakote (Hepatotoxicity)  iodine (Other)  sulfa drugs (Seizure)  Topamax (Other)    Intolerances        REVIEW OF SYSTEMS:    All other review of systems is negative unless indicated above    Vital Signs Last 24 Hrs  T(C): 36.4 (20 Mar 2023 04:42), Max: 36.7 (19 Mar 2023 19:51)  T(F): 97.6 (20 Mar 2023 04:42), Max: 98 (19 Mar 2023 19:51)  HR: 76 (20 Mar 2023 04:42) (60 - 76)  BP: 160/82 (20 Mar 2023 04:42) (133/80 - 175/83)  BP(mean): --  RR: 18 (20 Mar 2023 04:42) (18 - 18)  SpO2: 96% (20 Mar 2023 04:42) (95% - 97%)    Parameters below as of 20 Mar 2023 04:42  Patient On (Oxygen Delivery Method): room air        I&O's Summary      PHYSICAL EXAM:    Constitutional: NAD, awake and alert, well-developed  Eyes:  EOMI,  Pupils round, No oral cyanosis.  HEENT: No exudate or erythema  Pulmonary: Non-labored, breath sounds are clear bilaterally, No wheezing, rales or rhonchi  Cardiovascular: Regular, S1 and S2, No murmurs, rubs, gallops oir clicks  Gastrointestinal: Bowel Sounds present, soft, nontender.   Ext: No significant LE edema  Neurological: Alert, no gross focal motor deficits  Skin: No rashes.  Psych:  Mood & affect appropriate    LABS: All Labs Reviewed:                        16.0   8.39  )-----------( 572      ( 19 Mar 2023 08:59 )             49.8                         15.8   8.68  )-----------( 637      ( 18 Mar 2023 09:00 )             50.5                         15.2   8.22  )-----------( 615      ( 17 Mar 2023 11:00 )             48.3     19 Mar 2023 08:59    133    |  103    |  12     ----------------------------<  104    4.2     |  25     |  0.63   18 Mar 2023 09:00    131    |  104    |  8      ----------------------------<  119    4.2     |  21     |  0.71   17 Mar 2023 11:00    129    |  99     |  12     ----------------------------<  129    4.1     |  22     |  0.73     Ca    8.7        19 Mar 2023 08:59  Ca    8.7        18 Mar 2023 09:00  Ca    8.8        17 Mar 2023 11:00    TPro  6.2    /  Alb  3.2    /  TBili  1.1    /  DBili  x      /  AST  16     /  ALT  27     /  AlkPhos  84     18 Mar 2023 09:00  TPro  6.0    /  Alb  3.2    /  TBili  0.8    /  DBili  x      /  AST  20     /  ALT  30     /  AlkPhos  73     17 Mar 2023 11:00    PT/INR - ( 19 Mar 2023 08:59 )   PT: 27.8 sec;   INR: 2.36 ratio               Blood Culture: Organism --  Gram Stain Blood -- Gram Stain --  Specimen Source Clean Catch Clean Catch (Midstream)  Culture-Blood --    Organism --  Gram Stain Blood -- Gram Stain --  Specimen Source .Blood Blood-Peripheral  Culture-Blood --    Organism --  Gram Stain Blood -- Gram Stain --  Specimen Source .Blood Blood-Peripheral  Culture-Blood --        03-18 @ 09:00  TSH: 2.87      RADIOLOGY/EKG:    Attending Attestation:   20 minutes spent on total encounter; more than 50% of the visit was spent counseling and/or coordinating care by the attending physician.     ASSESSMENT AND PLAN
Patient is a 88y old  Female who presents with a chief complaint of weakness, hypotension (20 Mar 2023 07:00)    Date of servie : 03-20-23 @ 13:18  INTERVAL HPI/OVERNIGHT EVENTS:  T(C): 36.4 (03-20-23 @ 11:11), Max: 36.7 (03-19-23 @ 19:51)  HR: 64 (03-20-23 @ 11:11) (60 - 76)  BP: 134/79 (03-20-23 @ 11:11) (133/80 - 160/82)  RR: 15 (03-20-23 @ 11:11) (15 - 18)  SpO2: 94% (03-20-23 @ 11:11) (94% - 96%)  Wt(kg): --  I&O's Summary      LABS:                        16.0   8.39  )-----------( 572      ( 19 Mar 2023 08:59 )             49.8     03-19    133<L>  |  103  |  12  ----------------------------<  104<H>  4.2   |  25  |  0.63    Ca    8.7      19 Mar 2023 08:59      PT/INR - ( 20 Mar 2023 07:40 )   PT: 17.7 sec;   INR: 1.51 ratio             CAPILLARY BLOOD GLUCOSE                MEDICATIONS  (STANDING):  amLODIPine   Tablet 5 milliGRAM(s) Oral once  aspirin enteric coated 81 milliGRAM(s) Oral daily  carvedilol 6.25 milliGRAM(s) Oral every 12 hours  influenza  Vaccine (HIGH DOSE) 0.7 milliLiter(s) IntraMuscular once  levothyroxine 100 MICROGram(s) Oral daily  nitrofurantoin monohydrate/macrocrystals (MACROBID) 100 milliGRAM(s) Oral two times a day  phenazopyridine 100 milliGRAM(s) Oral every 8 hours  valsartan 320 milliGRAM(s) Oral daily    MEDICATIONS  (PRN):          PHYSICAL EXAM:  GENERAL: NAD, well-groomed, well-developed  HEAD:  Atraumatic, Normocephalic  CHEST/LUNG: Clear to percussion bilaterally; No rales, rhonchi, wheezing, or rubs  HEART: Regular rate and rhythm; No murmurs, rubs, or gallops  ABDOMEN: Soft, Nontender, Nondistended; Bowel sounds present  EXTREMITIES:  2+ Peripheral Pulses, No clubbing, cyanosis, or edema  LYMPH: No lymphadenopathy noted  SKIN: No rashes or lesions    Care Discussed with Consultants/Other Providers [ ] YES  [ ] NO
Patient is a 88y old  Female who presents with a chief complaint of weakness, hypotension (18 Mar 2023 08:41)        INTERVAL HPI/OVERNIGHT EVENTS:  T(C): 36.9 (23 @ 04:43), Max: 36.9 (23 @ 04:43)  HR: 82 (23 @ 12:10) (60 - 82)  BP: 168/92 (23 @ 12:10) (92/59 - 168/92)  RR: 18 (23 @ 04:43) (18 - 18)  SpO2: 96% (23 @ 12:10) (96% - 98%)  Wt(kg): --  I&O's Summary    17 Mar 2023 07:01  -  18 Mar 2023 07:00  --------------------------------------------------------  IN: 0 mL / OUT: 1050 mL / NET: -1050 mL        LABS:                        15.8   8.68  )-----------( 637      ( 18 Mar 2023 09:00 )             50.5     03-18    131<L>  |  104  |  8   ----------------------------<  119<H>  4.2   |  21<L>  |  0.71    Ca    8.7      18 Mar 2023 09:00    TPro  6.2  /  Alb  3.2<L>  /  TBili  1.1  /  DBili  x   /  AST  16  /  ALT  27  /  AlkPhos  84  03-18    PT/INR - ( 18 Mar 2023 09:00 )   PT: 56.9 sec;   INR: 4.79 ratio           Urinalysis Basic - ( 17 Mar 2023 13:11 )    Color: Yellow / Appearance: Slightly Turbid / S.010 / pH: x  Gluc: x / Ketone: Negative  / Bili: Negative / Urobili: Negative   Blood: x / Protein: Negative / Nitrite: Negative   Leuk Esterase: Negative / RBC: 0-2 /HPF / WBC 0-2   Sq Epi: x / Non Sq Epi: Few / Bacteria: Few      CAPILLARY BLOOD GLUCOSE            Urinalysis Basic - ( 17 Mar 2023 13:11 )    Color: Yellow / Appearance: Slightly Turbid / S.010 / pH: x  Gluc: x / Ketone: Negative  / Bili: Negative / Urobili: Negative   Blood: x / Protein: Negative / Nitrite: Negative   Leuk Esterase: Negative / RBC: 0-2 /HPF / WBC 0-2   Sq Epi: x / Non Sq Epi: Few / Bacteria: Few        MEDICATIONS  (STANDING):  aspirin enteric coated 81 milliGRAM(s) Oral daily  carvedilol 6.25 milliGRAM(s) Oral every 12 hours  influenza  Vaccine (HIGH DOSE) 0.7 milliLiter(s) IntraMuscular once  levothyroxine 100 MICROGram(s) Oral daily  valsartan 320 milliGRAM(s) Oral daily    MEDICATIONS  (PRN):      REVIEW OF SYSTEMS:  CONSTITUTIONAL: fatigue  EYES: No eye pain, visual disturbances, or discharge  ENMT:  No difficulty hearing, tinnitus, vertigo; No sinus or throat pain  NECK: No pain or stiffness  RESPIRATORY: No cough, wheezing, chills or hemoptysis; No shortness of breath  CARDIOVASCULAR: No chest pain, palpitations, dizziness, or leg swelling  GASTROINTESTINAL: No abdominal or epigastric pain. No nausea, vomiting, or hematemesis; No diarrhea or constipation. No melena or hematochezia.  GENITOURINARY: No dysuria, frequency, hematuria, or incontinence  NEUROLOGICAL: No headaches, memory loss, loss of strength, numbness, or tremors  SKIN: No itching, burning, rashes, or lesions   LYMPH NODES: No enlarged glands  ENDOCRINE: No heat or cold intolerance; No hair loss  MUSCULOSKELETAL: No joint pain or swelling; No muscle, back, or extremity pain  PSYCHIATRIC: No depression, anxiety, mood swings, or difficulty sleeping  HEME/LYMPH: No easy bruising, or bleeding gums  ALLERY AND IMMUNOLOGIC: No hives or eczema    RADIOLOGY & ADDITIONAL TESTS:    Imaging Personally Reviewed:  [x ] YES  [ ] NO    Consultant(s) Notes Reviewed:  [x ] YES  [ ] NO    PHYSICAL EXAM:  GENERAL: NAD, well-groomed, well-developed  HEAD:  Atraumatic, Normocephalic  EYES: EOMI, PERRLA, conjunctiva and sclera clear  ENMT: No tonsillar erythema, exudates, or enlargement; Moist mucous membranes, Good dentition, No lesions  NECK: Supple, No JVD, Normal thyroid  NERVOUS SYSTEM:  Alert & Oriented X3, Good concentration; Motor Strength 5/5 B/L upper and lower extremities; DTRs 2+ intact and symmetric  CHEST/LUNG: Clear to percussion bilaterally; No rales, rhonchi, wheezing, or rubs  HEART: Regular rate and rhythm; No murmurs, rubs, or gallops  ABDOMEN: Soft, Nontender, Nondistended; Bowel sounds present  EXTREMITIES:  2+ Peripheral Pulses, No clubbing, cyanosis, or edema  LYMPH: No lymphadenopathy noted  SKIN: No rashes or lesions    Care Discussed with Consultants/Other Providers [x ] YES  [ ] NO    advance care planning and advance directives discussed, including but not limited to long term care planning, and all forms reviewed  [x]YES  [ ][NO

## 2023-03-21 NOTE — DISCHARGE NOTE PROVIDER - CARE PROVIDER_API CALL
Davion Galeano (DO)  Cardiovascular Disease  [address], [address]  [place] [address]  Phone: [phone]  Fax: [phone]  Follow Up Time:

## 2023-03-21 NOTE — DISCHARGE NOTE PROVIDER - NSDCMRMEDTOKEN_GEN_ALL_CORE_FT
amLODIPine 10 mg oral tablet: 1 tab(s) orally every 24 hours  aspirin 81 mg oral delayed release tablet: 1 tab(s) orally once a day  carvedilol 6.25 mg oral tablet: 1 tab(s) orally every 12 hours  levothyroxine 100 mcg (0.1 mg) oral tablet: 1 tab(s) orally once a day  valsartan 320 mg oral tablet: 1 tab(s) orally once a day  warfarin 3 mg oral tablet: 1 tab(s) orally once a day

## 2023-03-21 NOTE — DISCHARGE NOTE PROVIDER - HOSPITAL COURSE
87 yr old female with weakness and labile BP and palpitations     Problem/Plan - 1:  ·  Problem: HTN (hypertension).   ·  Plan: BP labile  patient unclear if palpitations coincided with hypotensive episode  dc'd spironolactone re hyponatremia as well  dc'd hydralazine as well (?eitology of her diarrhea)    3/18 am bp 92/50- will hold off verapamil for now as well  3/19- will add norvasc.  3/20 increased norvasc to 10 daily      Problem/Plan - 2:  ·  Problem: Afib.   ·  Plan: dose coumadin daily   cont tess agents.     Problem/Plan - 3:  ·  Problem: Pyelonephritis.   ·  Plan: doubt infectious process re bland urine, no fever, no WBC     Problem/Plan - 4:  ·  Problem: Weakness.   ·  Plan: multifactorial etiology  PT eval.

## 2023-03-21 NOTE — CAREGIVER ENGAGEMENT NOTE - CAREGIVER OUTREACH NOTES - FREE TEXT
CM confirmed that patient's son is her caregiver. CM discussed that per MD pt is stable for transition to home today. Discussed that referral was sent to [facility] for visiting nurse requesting start of care 3/21/23, and they are awaiting approval from insurance. Pt's daughter will be transporting her home. Pt and family are in agreement with transition to home today.
Spoke with pateint and HCP, Be at bedside.  Pt states that Be makes all of her decisions and she consented for CM to speak with him present.

## 2023-03-21 NOTE — PROGRESS NOTE ADULT - REASON FOR ADMISSION
weakness, hypotension

## 2023-03-21 NOTE — PROGRESS NOTE ADULT - ASSESSMENT
87F with h/o A-Fib, HTN, thyroid disease, S/P left knee surgery, S/P MARIA LUZ a/w weakness and hypotension clinically improving. Positive palpitations    INR-5.02  BNP-803    Complaining of some palpitations  Monitor without any significant arrhythmia or increased heart rates    Suggest:  1. HTN  - Continue Valsartan-320mg OD  - c/w Coreg-6.25mg BID  - c/w Verapamil SR-120mg OD    2. AF  - c/w Warfarin to be adjusted according by INR level  - If no contra-indication, maybe Warfarin should be replaced by Apixaban?    3. Follow labs  - Spironolactone and Hydralazine on hold  - Follow labs 87F with h/o A-Fib, HTN, thyroid disease, S/P left knee surgery, S/P MARIA LUZ a/w weakness and hypotension clinically improving. Positive palpitations    INR-5.02  BNP-803    Complaining of some palpitations  Monitor without any significant arrhythmia or increased heart rates    Suggest:  1. HTN  - Continue Valsartan-320mg OD  - c/w Coreg-6.25mg BID  - c/w amlodipine 10mg daily    2. AF  - c/w Warfarin to be adjusted according by INR level  - If no contra-indication, maybe Warfarin should be replaced by Apixaban?    3. Follow labs  - Spironolactone and Hydralazine on hold  - Follow labs

## 2023-03-21 NOTE — DISCHARGE NOTE PROVIDER - NSDCCPCAREPLAN_GEN_ALL_CORE_FT
PRINCIPAL DISCHARGE DIAGNOSIS  Diagnosis: Weakness  Assessment and Plan of Treatment:       SECONDARY DISCHARGE DIAGNOSES  Diagnosis: Pyelonephritis  Assessment and Plan of Treatment:     Diagnosis: Labile blood pressure  Assessment and Plan of Treatment:

## 2023-03-21 NOTE — DISCHARGE NOTE PROVIDER - NSDCFUADDAPPT_GEN_ALL_CORE_FT
Call [phone] with your insurance information and they will assist you in finding a [facility] affiliated MD ( including specialists) in your area

## 2023-03-22 LAB
CULTURE RESULTS: SIGNIFICANT CHANGE UP
CULTURE RESULTS: SIGNIFICANT CHANGE UP
METANEPHRINE, PL: 52.2 PG/ML — SIGNIFICANT CHANGE UP (ref 0–88)
NORMETANEPHRINE, PL: 126.9 PG/ML — SIGNIFICANT CHANGE UP (ref 0–297.2)
SPECIMEN SOURCE: SIGNIFICANT CHANGE UP
SPECIMEN SOURCE: SIGNIFICANT CHANGE UP

## 2023-05-22 ENCOUNTER — OFFICE (OUTPATIENT)
Dept: URBAN - METROPOLITAN AREA CLINIC 109 | Facility: CLINIC | Age: 88
Setting detail: OPHTHALMOLOGY
End: 2023-05-22
Payer: MEDICARE

## 2023-05-22 DIAGNOSIS — H40.013: ICD-10-CM

## 2023-05-22 DIAGNOSIS — H18.503: ICD-10-CM

## 2023-05-22 PROCEDURE — 92083 EXTENDED VISUAL FIELD XM: CPT | Performed by: OPHTHALMOLOGY

## 2023-05-22 PROCEDURE — 99213 OFFICE O/P EST LOW 20 MIN: CPT | Performed by: OPHTHALMOLOGY

## 2023-05-22 PROCEDURE — 92133 CPTRZD OPH DX IMG PST SGM ON: CPT | Performed by: OPHTHALMOLOGY

## 2023-05-22 ASSESSMENT — AXIALLENGTH_DERIVED
OS_AL: 23.85
OS_AL: 23.316
OD_AL: 25.53
OD_AL: 23.0115
OS_AL: 26.08

## 2023-05-22 ASSESSMENT — REFRACTION_CURRENTRX
OS_ADD: +3.00
OS_AXIS: 17
OD_OVR_VA: 20/
OD_CYLINDER: -1.75
OS_SPHERE: -0.75
OS_OVR_VA: 20/
OS_CYLINDER: -2.00
OD_SPHERE: -0.25
OD_AXIS: 86
OD_ADD: +3.00

## 2023-05-22 ASSESSMENT — KERATOMETRY
OD_AXISANGLE_DEGREES: 167
OS_AXISANGLE_DEGREES: 173
OD_K1POWER_DIOPTERS: 44.37
OS_K2POWER_DIOPTERS: 44.87
OS_K1POWER_DIOPTERS: 43.12
OD_K2POWER_DIOPTERS: 45.87

## 2023-05-22 ASSESSMENT — REFRACTION_MANIFEST
OD_VA1: 20/20
OD_SPHERE: -4.75
OD_ADD: +2.75
OD_ADD: +2.75
OS_SPHERE: +2.00
OD_SPHERE: +1.50
OS_AXIS: 95
OD_CYLINDER: -2.75
OS_ADD: +2.75
OD_VA1: 20/30
OS_CYLINDER: -3.50
OD_AXIS: 85
OS_VA1: 20/40
OS_SPHERE: -4.50
OD_CYLINDER: -3.00
OD_AXIS: 86
OS_AXIS: 100
OS_CYLINDER: -3.50
OS_ADD: +2.75
OS_VA1: 20/20

## 2023-05-22 ASSESSMENT — SPHEQUIV_DERIVED
OS_SPHEQUIV: -1.125
OS_SPHEQUIV: -6.25
OS_SPHEQUIV: 0.25
OD_SPHEQUIV: -6.125
OD_SPHEQUIV: 0

## 2023-05-22 ASSESSMENT — PACHYMETRY
OS_CT_CORRECTION: -6
OS_CT_UM: 621
OD_CT_CORRECTION: -6
OD_CT_UM: 634

## 2023-05-22 ASSESSMENT — VISUAL ACUITY
OD_BCVA: 20/70
OS_BCVA: 20/20

## 2023-05-22 ASSESSMENT — LID POSITION - PTOSIS
OD_PTOSIS: RUL 1+
OS_PTOSIS: LUL 1+

## 2023-05-22 ASSESSMENT — REFRACTION_AUTOREFRACTION
OD_SPHERE: +0.5-
OS_CYLINDER: -4.25
OD_AXIS: 080
OD_CYLINDER: +2.25
OS_AXIS: 092
OS_SPHERE: +1.00

## 2023-05-22 ASSESSMENT — CONFRONTATIONAL VISUAL FIELD TEST (CVF)
OS_FINDINGS: FULL
OD_FINDINGS: FULL

## 2023-05-22 ASSESSMENT — TONOMETRY
OD_IOP_MMHG: 16
OS_IOP_MMHG: 16

## 2023-06-13 NOTE — DISCHARGE NOTE NURSING/CASE MANAGEMENT/SOCIAL WORK - NSFLUVACAGEDISCH_IMM_ALL_CORE
LABS:                         6.4    26.39 )-----------( 196      ( 13 Jun 2023 02:03 )             25.0     06-13    143  |  100  |  88<H>  ----------------------------<  x   5.1   |  x   |  2.25<H>    Ca    10.6<H>      13 Jun 2023 02:03    TPro  6.5  /  Alb  2.6<L>  /  TBili  0.4  /  DBili  x   /  AST  269<H>  /  ALT  145<H>  /  AlkPhos  185<H>  06-13    PT/INR - ( 13 Jun 2023 02:03 )   PT: 23.5 sec;   INR: 1.96          PTT - ( 13 Jun 2023 02:03 )  PTT:67.5 sec          Lactate, Blood: 17.0 mmol/L (06-13 @ 02:03)      RADIOLOGY, EKG & ADDITIONAL TESTS: Adult

## 2023-07-11 NOTE — H&P ADULT - PATIENT'S SEXUAL ORIENTATION
Heterosexual Spironolactone Pregnancy And Lactation Text: This medication can cause feminization of the male fetus and should be avoided during pregnancy. The active metabolite is also found in breast milk.

## 2023-08-07 NOTE — ED ADULT NURSE NOTE - ISOLATION TYPE:
[Shortness Of Breath] : shortness of breath [Dyspnea on Exertion] : dyspnea on exertion [Muscle Weakness] : muscle weakness [Negative] : Heme/Lymph None

## 2024-01-01 NOTE — PATIENT PROFILE ADULT - MONEY FOR FOOD
NICU Progress Note    Sanya Birmingham (akemir Hill) is a  male infant born 2024  3:43 PM at Gestational Age: 34w5d now at age: 12 day old  Patient Active Problem List    Diagnosis Date Noted    Respiratory distress 2024     Priority: Low     Subjective: Continues on RA. Had a brief run of SVT this morning, able to break with ice to the face and gagging. PIV was placed, but no adenosine given.     Tolerating full enteral feeds. PO is progressing, took 130 ml/kg in the past 24 hours. Down -3% from BW. But gained weight.        Objective:  Vitals Last Value 24-Hour Range   Temperature 98.5 °F (36.9 °C) (24 0930) Temp  Min: 98.5 °F (36.9 °C)  Max: 99.1 °F (37.3 °C)   Pulse 176 (24 1000) Pulse  Min: 145  Max: 197   Respiratory (!) 63 (24 1000) Resp  Min: 30  Max: 78   Non-Invasive Blood Pressure 78/35 (24 0330) BP  Min: 73/43  Max: 81/38   Arterial Blood Pressure   No data recorded   Mean Arterial Pressure 50 (24 0330) MAP (mmHg)  Min: 50  Max: 52   Pulse Oximetry 99 % (24 1000) SpO2  Min: 93 %  Max: 100 %     Vent Settings Last Value: RA     Last Apnea:    and intervention:  None recorded    Physical Exam:  Birthweight:  6 lb 3.8 oz (2830 g) with weight change of -3% since birth  Current weight: Patient Vitals for the past 24 hrs:   Weight   02/15/24 1830 2758 g      with a weight change of Weight change: 12 g overnight  Gen: Sleeping in open crib, reactive to exam  Skin: Well perfused, mild jaundice, no edema  HEENT: Anterior fontanel open, soft, flat, feeding tube in place  Lungs: CTAB, good air movement    CV:  RRR, no murmur, capillary refill brisk, good pulses.   Abdomen: Rounded but soft, not tender, active BS   Circumcised male  Neuro: Sleeping but appropriately reactive to exam, tone appears normal for age    Fluids:  Intake/Output         02/15 0700  / 0659  0700   0659    P.O. (mL/kg) 360 (130.53)     NG/GT (mL/kg)      Total Intake(mL/kg)  360 (130.53)     Net +360           Urine Occurrence 6 x             Source Rate Total (on BirthWeight: 2830 g (Filed from Delivery Summary)        Feeds - Neo24 Ad carmelina 130 mL/kg/d    Urine   mixed       Last Void:  1 (24 0600)x6  Last Stool:  1 (24 0915) x2    Labs: None in past 24 hours      Medications:  Current Facility-Administered Medications   Medication    adenosine (ADENOCARD)  injection 0.282 mg    glycerin 99.5% 0.375 g  liquid     Impression:   male infant at 34 5/7 weeks, now corrected to 36w 3d -   Respiratory distress-resolved   hyperbilirubinemia-resolving  Immature feeding pattern  SVT    Plan:  Resp: Monitor for alarms in RA    CV: Monitor HR and BP. Obtain EKG.   FEN: Continue Neosure feeds fortified to 24kcal. Encourage PO with cues. Continue ad carmelina. Monitor weight and tolerance. SLP following.   Heme: Follow jaundice clinically. Start Fe at 2 weeks of life or at discharge.   ID:  Monitor clinically   Neuro: Head ultrasound is not indicated for this infant.  Monitor clinically.   Mom called - updated about SVT  Current antibiotic status:  None     I saw and examined Boy Floresita Birmingham on 2024 at 10:39 AM and patient requires: NICU Intensive Care: Enteral/ Parental nutritional adjustments   no

## 2024-05-10 NOTE — ASU DISCHARGE PLAN (ADULT/PEDIATRIC) - NO SWIMMING UNTIL YOU CHECK WITH YOUR DOCTOR
Why does patient want a coronary CTA and how does she know about this     Does not look like it was ever discussed with her     Is she looking like calcium score, CTA FFR, or CTA chest like PE rule out     Is she symptomatic? She has history of chest pains and shortness of breath with normal stress test 2022     Thanks      Statement Selected

## 2024-05-29 ENCOUNTER — OFFICE (OUTPATIENT)
Dept: URBAN - METROPOLITAN AREA CLINIC 109 | Facility: CLINIC | Age: 89
Setting detail: OPHTHALMOLOGY
End: 2024-05-29
Payer: MEDICARE

## 2024-05-29 DIAGNOSIS — H40.013: ICD-10-CM

## 2024-05-29 DIAGNOSIS — H18.503: ICD-10-CM

## 2024-05-29 DIAGNOSIS — H26.493: ICD-10-CM

## 2024-05-29 PROCEDURE — 92014 COMPRE OPH EXAM EST PT 1/>: CPT | Performed by: OPHTHALMOLOGY

## 2024-05-29 PROCEDURE — 92133 CPTRZD OPH DX IMG PST SGM ON: CPT | Performed by: OPHTHALMOLOGY

## 2024-05-29 PROCEDURE — 92083 EXTENDED VISUAL FIELD XM: CPT | Performed by: OPHTHALMOLOGY

## 2024-05-29 ASSESSMENT — CONFRONTATIONAL VISUAL FIELD TEST (CVF)
OS_FINDINGS: FULL
OD_FINDINGS: FULL

## 2024-05-29 ASSESSMENT — LID POSITION - PTOSIS
OS_PTOSIS: LUL 1+
OD_PTOSIS: RUL 1+

## 2024-08-01 ENCOUNTER — APPOINTMENT (OUTPATIENT)
Dept: ORTHOPEDIC SURGERY | Facility: CLINIC | Age: 89
End: 2024-08-01
Payer: MEDICARE

## 2024-08-01 VITALS — BODY MASS INDEX: 28.12 KG/M2 | HEIGHT: 66 IN | WEIGHT: 175 LBS

## 2024-08-01 DIAGNOSIS — Z86.03 PERSONAL HISTORY OF NEOPLASM OF UNCERTAIN BEHAVIOR: ICD-10-CM

## 2024-08-01 DIAGNOSIS — Z86.73 PERSONAL HISTORY OF TRANSIENT ISCHEMIC ATTACK (TIA), AND CEREBRAL INFARCTION W/OUT RESIDUAL DEFICITS: ICD-10-CM

## 2024-08-01 DIAGNOSIS — M54.50 LOW BACK PAIN, UNSPECIFIED: ICD-10-CM

## 2024-08-01 DIAGNOSIS — Z86.79 PERSONAL HISTORY OF OTHER DISEASES OF THE CIRCULATORY SYSTEM: ICD-10-CM

## 2024-08-01 DIAGNOSIS — M48.50XA COLLAPSED VERTEBRA, NOT ELSEWHERE CLASSIFIED, SITE UNSPECIFIED, INITIAL ENCOUNTER FOR FRACTURE: ICD-10-CM

## 2024-08-01 DIAGNOSIS — Z86.39 PERSONAL HISTORY OF OTHER ENDOCRINE, NUTRITIONAL AND METABOLIC DISEASE: ICD-10-CM

## 2024-08-01 DIAGNOSIS — Z15.89 GENETIC SUSCEPTIBILITY TO OTHER DISEASE: ICD-10-CM

## 2024-08-01 PROCEDURE — 72110 X-RAY EXAM L-2 SPINE 4/>VWS: CPT

## 2024-08-01 PROCEDURE — 99204 OFFICE O/P NEW MOD 45 MIN: CPT

## 2024-08-01 NOTE — HISTORY OF PRESENT ILLNESS
[Lower back] : lower back [Dull/Aching] : dull/aching [Localized] : localized [Shooting] : shooting [Tightness] : tightness [de-identified] : 08/01/2024: 89 yr old female c/o lower back pain that developed a while ago. States she had broken her vertebra in the past, has HX back pain.  Had recent fall in bathroom.  Now having severe pain just in low back. No radicular complaints.  Feels similar to back pain.  Does gentle stretching exercises.  pain was severe enough she considered and pain relented.  Pain has started to improved. Has been using heating pad.  Tried Tylenol without relief.   hx of osteoporosis, VCF, polycythemia vera,

## 2024-08-01 NOTE — ASSESSMENT
Stable and controlled on Advair 100 mcg twice daily with albuterol as needed.  Follows with allergy, can follow-up with me as needed.   [FreeTextEntry1] : 89 F with low back pain and spasm. Hx of Vcf.  MRI L spine rule out acute VCF FU after MRI

## 2024-08-01 NOTE — IMAGING
[de-identified] : Spine: Inspection/Palpation: No tenderness to palpation throughout Cervical/thoracic/lumbar spine. No bony stepoffs, No lesions.  Gait: Non-antalgic, able to perform bilateral toe and heel rise.  Able to perform tandem gait.    Range of Motion: Lumbar Spine: Flexion to 90 degrees, Extension to 30 degrees, rotation 30 degrees bilaterally, lateral flexion to 30 degrees bilaterally.  Neurologic:  Bilateral Lower Extremities 5/5 Iliopsoas/Quadriceps/Hamstrings/ Tibialis Anterior/ Gastrocnemius. Extensor Hallucis Longus/ Flexor Hallucis Longus except    Sensation intact to light touch L2-S1  Patellar/ Achilles reflex within normal limits.   Negative straight leg raise  No pain with IR/ER/Flexion of HIps bilaterally   X-ray Ap/Lateral/Flexion/Extension of lumbar spine were viewed and interpreted.  Normal alignment is maintained without any spondylolisthesis. VCF at L2 and L3

## 2024-08-08 ENCOUNTER — APPOINTMENT (OUTPATIENT)
Dept: ORTHOPEDIC SURGERY | Facility: CLINIC | Age: 89
End: 2024-08-08

## 2024-08-12 ENCOUNTER — APPOINTMENT (OUTPATIENT)
Dept: ORTHOPEDIC SURGERY | Facility: CLINIC | Age: 89
End: 2024-08-12
Payer: MEDICARE

## 2024-08-12 VITALS — BODY MASS INDEX: 28.12 KG/M2 | WEIGHT: 175 LBS | HEIGHT: 66 IN

## 2024-08-12 DIAGNOSIS — M54.50 LOW BACK PAIN, UNSPECIFIED: ICD-10-CM

## 2024-08-12 PROCEDURE — 99213 OFFICE O/P EST LOW 20 MIN: CPT

## 2024-08-12 NOTE — IMAGING
[de-identified] : Spine: Inspection/Palpation: No tenderness to palpation throughout Cervical/thoracic/lumbar spine. No bony stepoffs, No lesions.  Gait: Non-antalgic, able to perform bilateral toe and heel rise.  Able to perform tandem gait.    Range of Motion: Lumbar Spine: Flexion to 90 degrees, Extension to 30 degrees, rotation 30 degrees bilaterally, lateral flexion to 30 degrees bilaterally.  Neurologic:  Bilateral Lower Extremities 5/5 Iliopsoas/Quadriceps/Hamstrings/ Tibialis Anterior/ Gastrocnemius. Extensor Hallucis Longus/ Flexor Hallucis Longus except    Sensation intact to light touch L2-S1  Patellar/ Achilles reflex within normal limits.   Negative straight leg raise  No pain with IR/ER/Flexion of HIps bilaterally   X-ray Ap/Lateral/Flexion/Extension of lumbar spine were viewed and interpreted.  Normal alignment is maintained without any spondylolisthesis. VCF at L2 and L3

## 2024-08-12 NOTE — HISTORY OF PRESENT ILLNESS
[Lower back] : lower back [Dull/Aching] : dull/aching [Localized] : localized [Shooting] : shooting [Tightness] : tightness [de-identified] : 08/12/2024: MRI lumbar spine review   08/01/2024: 89 yr old female c/o lower back pain that developed a while ago. States she had broken her vertebra in the past, has HX back pain.  Had recent fall in bathroom.  Now having severe pain just in low back. No radicular complaints.  Feels similar to back pain.  Does gentle stretching exercises.  pain was severe enough she considered and pain relented.  Pain has started to improved. Has been using heating pad.  Tried Tylenol without relief.   hx of osteoporosis, VCF, polycythemia vera,

## 2024-08-12 NOTE — ASSESSMENT
[FreeTextEntry1] : 89 F with low back pain and spasm. Hx of Vcf.  No acute Vcf Pain peristis Pain managment referral PT FU after trail of injecitons

## 2024-09-12 ENCOUNTER — APPOINTMENT (OUTPATIENT)
Dept: PAIN MANAGEMENT | Facility: CLINIC | Age: 89
End: 2024-09-12

## 2024-10-28 ENCOUNTER — EMERGENCY (EMERGENCY)
Facility: HOSPITAL | Age: 89
LOS: 1 days | Discharge: HOME | End: 2024-10-28
Attending: STUDENT IN AN ORGANIZED HEALTH CARE EDUCATION/TRAINING PROGRAM | Admitting: STUDENT IN AN ORGANIZED HEALTH CARE EDUCATION/TRAINING PROGRAM
Payer: MEDICARE

## 2024-10-28 VITALS
HEART RATE: 66 BPM | TEMPERATURE: 98 F | RESPIRATION RATE: 18 BRPM | OXYGEN SATURATION: 97 % | SYSTOLIC BLOOD PRESSURE: 156 MMHG | DIASTOLIC BLOOD PRESSURE: 77 MMHG

## 2024-10-28 VITALS
RESPIRATION RATE: 18 BRPM | DIASTOLIC BLOOD PRESSURE: 76 MMHG | TEMPERATURE: 98 F | OXYGEN SATURATION: 95 % | SYSTOLIC BLOOD PRESSURE: 131 MMHG | HEART RATE: 70 BPM | WEIGHT: 173.06 LBS | HEIGHT: 65 IN

## 2024-10-28 DIAGNOSIS — Z90.710 ACQUIRED ABSENCE OF BOTH CERVIX AND UTERUS: Chronic | ICD-10-CM

## 2024-10-28 DIAGNOSIS — Z98.890 OTHER SPECIFIED POSTPROCEDURAL STATES: Chronic | ICD-10-CM

## 2024-10-28 LAB
ALBUMIN SERPL ELPH-MCNC: 4.1 G/DL — SIGNIFICANT CHANGE UP (ref 3.3–5)
ALP SERPL-CCNC: 95 U/L — SIGNIFICANT CHANGE UP (ref 40–120)
ALT FLD-CCNC: 30 U/L — SIGNIFICANT CHANGE UP (ref 12–78)
ANION GAP SERPL CALC-SCNC: 7 MMOL/L — SIGNIFICANT CHANGE UP (ref 5–17)
APTT BLD: 46.5 SEC — HIGH (ref 24.5–35.6)
AST SERPL-CCNC: 30 U/L — SIGNIFICANT CHANGE UP (ref 15–37)
BASOPHILS # BLD AUTO: 0.14 K/UL — SIGNIFICANT CHANGE UP (ref 0–0.2)
BASOPHILS NFR BLD AUTO: 1.6 % — SIGNIFICANT CHANGE UP (ref 0–2)
BILIRUB SERPL-MCNC: 1.5 MG/DL — HIGH (ref 0.2–1.2)
BUN SERPL-MCNC: 16 MG/DL — SIGNIFICANT CHANGE UP (ref 7–23)
CALCIUM SERPL-MCNC: 10.1 MG/DL — SIGNIFICANT CHANGE UP (ref 8.5–10.1)
CHLORIDE SERPL-SCNC: 107 MMOL/L — SIGNIFICANT CHANGE UP (ref 96–108)
CK MB CFR SERPL CALC: 1.3 NG/ML — SIGNIFICANT CHANGE UP (ref 0–3.6)
CO2 SERPL-SCNC: 25 MMOL/L — SIGNIFICANT CHANGE UP (ref 22–31)
CREAT SERPL-MCNC: 0.85 MG/DL — SIGNIFICANT CHANGE UP (ref 0.5–1.3)
EGFR: 65 ML/MIN/1.73M2 — SIGNIFICANT CHANGE UP
EOSINOPHIL # BLD AUTO: 0.35 K/UL — SIGNIFICANT CHANGE UP (ref 0–0.5)
EOSINOPHIL NFR BLD AUTO: 3.9 % — SIGNIFICANT CHANGE UP (ref 0–6)
GLUCOSE SERPL-MCNC: 108 MG/DL — HIGH (ref 70–99)
HCT VFR BLD CALC: 57.8 % — CRITICAL HIGH (ref 34.5–45)
HGB BLD-MCNC: 19.1 G/DL — CRITICAL HIGH (ref 11.5–15.5)
IMM GRANULOCYTES NFR BLD AUTO: 0.2 % — SIGNIFICANT CHANGE UP (ref 0–0.9)
INR BLD: 2.86 RATIO — HIGH (ref 0.85–1.16)
LYMPHOCYTES # BLD AUTO: 1.98 K/UL — SIGNIFICANT CHANGE UP (ref 1–3.3)
LYMPHOCYTES # BLD AUTO: 22 % — SIGNIFICANT CHANGE UP (ref 13–44)
MCHC RBC-ENTMCNC: 27.7 PG — SIGNIFICANT CHANGE UP (ref 27–34)
MCHC RBC-ENTMCNC: 33 GM/DL — SIGNIFICANT CHANGE UP (ref 32–36)
MCV RBC AUTO: 83.8 FL — SIGNIFICANT CHANGE UP (ref 80–100)
MONOCYTES # BLD AUTO: 0.72 K/UL — SIGNIFICANT CHANGE UP (ref 0–0.9)
MONOCYTES NFR BLD AUTO: 8 % — SIGNIFICANT CHANGE UP (ref 2–14)
NEUTROPHILS # BLD AUTO: 5.77 K/UL — SIGNIFICANT CHANGE UP (ref 1.8–7.4)
NEUTROPHILS NFR BLD AUTO: 64.3 % — SIGNIFICANT CHANGE UP (ref 43–77)
NRBC # BLD: 0 /100 WBCS — SIGNIFICANT CHANGE UP (ref 0–0)
PLATELET # BLD AUTO: 562 K/UL — HIGH (ref 150–400)
POTASSIUM SERPL-MCNC: 4.2 MMOL/L — SIGNIFICANT CHANGE UP (ref 3.5–5.3)
POTASSIUM SERPL-SCNC: 4.2 MMOL/L — SIGNIFICANT CHANGE UP (ref 3.5–5.3)
PROT SERPL-MCNC: 7.8 G/DL — SIGNIFICANT CHANGE UP (ref 6–8.3)
PROTHROM AB SERPL-ACNC: 33.4 SEC — HIGH (ref 9.9–13.4)
RBC # BLD: 6.9 M/UL — HIGH (ref 3.8–5.2)
RBC # FLD: 17.5 % — HIGH (ref 10.3–14.5)
SODIUM SERPL-SCNC: 139 MMOL/L — SIGNIFICANT CHANGE UP (ref 135–145)
TROPONIN I, HIGH SENSITIVITY RESULT: 7.6 NG/L — SIGNIFICANT CHANGE UP
TROPONIN I, HIGH SENSITIVITY RESULT: 8 NG/L — SIGNIFICANT CHANGE UP
TSH SERPL-MCNC: 4.31 UIU/ML — HIGH (ref 0.36–3.74)
WBC # BLD: 8.98 K/UL — SIGNIFICANT CHANGE UP (ref 3.8–10.5)
WBC # FLD AUTO: 8.98 K/UL — SIGNIFICANT CHANGE UP (ref 3.8–10.5)

## 2024-10-28 PROCEDURE — 99284 EMERGENCY DEPT VISIT MOD MDM: CPT | Mod: 25

## 2024-10-28 PROCEDURE — 85730 THROMBOPLASTIN TIME PARTIAL: CPT

## 2024-10-28 PROCEDURE — 80053 COMPREHEN METABOLIC PANEL: CPT

## 2024-10-28 PROCEDURE — 85025 COMPLETE CBC W/AUTO DIFF WBC: CPT

## 2024-10-28 PROCEDURE — 99285 EMERGENCY DEPT VISIT HI MDM: CPT

## 2024-10-28 PROCEDURE — 96374 THER/PROPH/DIAG INJ IV PUSH: CPT

## 2024-10-28 PROCEDURE — 85610 PROTHROMBIN TIME: CPT

## 2024-10-28 PROCEDURE — 84443 ASSAY THYROID STIM HORMONE: CPT

## 2024-10-28 PROCEDURE — 71045 X-RAY EXAM CHEST 1 VIEW: CPT

## 2024-10-28 PROCEDURE — 36415 COLL VENOUS BLD VENIPUNCTURE: CPT

## 2024-10-28 PROCEDURE — 84484 ASSAY OF TROPONIN QUANT: CPT

## 2024-10-28 PROCEDURE — 71045 X-RAY EXAM CHEST 1 VIEW: CPT | Mod: 26

## 2024-10-28 PROCEDURE — 82553 CREATINE MB FRACTION: CPT

## 2024-10-28 RX ORDER — METHYLPREDNISOLONE ACETATE 80 MG/ML
125 INJECTION, SUSPENSION INTRA-ARTICULAR; INTRALESIONAL; INTRAMUSCULAR; SOFT TISSUE ONCE
Refills: 0 | Status: COMPLETED | OUTPATIENT
Start: 2024-10-28 | End: 2024-10-28

## 2024-10-28 RX ADMIN — METHYLPREDNISOLONE ACETATE 125 MILLIGRAM(S): 80 INJECTION, SUSPENSION INTRA-ARTICULAR; INTRALESIONAL; INTRAMUSCULAR; SOFT TISSUE at 16:35

## 2024-10-28 NOTE — CONSULT NOTE ADULT - NS ATTEND AMEND GEN_ALL_CORE FT
89 year old female with past medical history of afib on coumadin, CVA, hypothyroidism, polycythemia vera states platelets have been over 500 recently now presenting with chest pain.   Reports four day history of chest pain that wakes her up at night about hour and half after falling asleep. Chest pain is accompanied by occasional nausea , and feeling her heart beat in her head. Pain resolves without any intervention and is worse when taking a deep breath. Also last pm with right arm pain.  No recent illness, no v/d/ fever or chills. Reports active lifestyle, lives alone, does wash weekly and cleans house with no exertional complaints. Follows with Dr Rosario last seen last month. Has been compliant with her coumadin INR has been > 1.9     cp of uncertain etiology  occurs at rest ~1 hour after lying down  known af  ekg without acute ischemia or injury  trop x 1 negative  if 2 sets negative would consider dc home and outpt eval  may be gi noting the timing of her sxs and an empiric course of ppm may be appropriate  can follow with dr rosario

## 2024-10-28 NOTE — ED PROVIDER NOTE - CARE PROVIDERS DIRECT ADDRESSES
[email or web address]

## 2024-10-28 NOTE — CONSULT NOTE ADULT - ASSESSMENT
89 year old female with past medical history of afib, hypothyroidism, presenting with chest pain.       EKG    89 year old female with past medical history of afib on coumadin, CVA, hypothyroidism, polycythemia vera states platelets have been over 500 recently now presenting with chest pain.   Reports four day history of chest pain that wakes her up at night about hour and half after falling asleep. Chest pain is accompanied by occasional nausea , and feeling her heart beat in her head. Pain resolves without any intervention and is worse when taking a deep breath. Also last pm with right arm pain.  No recent illness, no v/d/ fever or chills. Reports active lifestyle, lives alone, does wash weekly and cleans house with no exertional complaints. Follows with Dr Galeano last seen last month. Has been compliant with her coumadin INR has been > 1.9     Atypical chest pain at rest   EKG pending  currently with no anginal complaints on exam   troponin pending   has known afib   on coumadin   INR 2-3   on coreg 6.25mg po bid    no sign fluid overload on exam   chest x ray pending      check TFT recently taken off synthroid     bp stable on home norvasc , losartan and coreg     Monitor and replete electrolytes. Keep K>4.0 and Mg>2.0.  further plan pending clinical course and results of above   will call Dr Galeano for outpt records

## 2024-10-28 NOTE — ED ADULT NURSE REASSESSMENT NOTE - NS ED NURSE REASSESS COMMENT FT1
Remains alert and oriented x4, calm and cooperative. Denies any pain now. Denies SOB now. VSS. Iv removed. OOB ambulating with rolling walker per baseline. daughter with patient. Pt and daughter educated on dc and stated an understanding. Left ER without issues.

## 2024-10-28 NOTE — ED PROVIDER NOTE - CLINICAL SUMMARY MEDICAL DECISION MAKING FREE TEXT BOX
I, Mak Moser MD, have seen and examined the patient on the date of service.  I agree with the MIMI's assessment as written, with exceptions or additions as noted below or in a separate note.  Patient with a past medical history of atrial fibrillation on warfarin is presenting with intermittent chest pain.  Symptoms wake her up in the middle the night and resolved by the time she tries to sit up.  They are nonexertional.  No associated shortness of breath.  No fevers.  No new cough reported.  No changes with eating.  On exam here she is well-appearing.  No focal breath sounds.  No reproducible tenderness.  ECG per my independent interpretation shows atrial fibrillation with rate of 64, normal axis, no ST segment elevation consistent with ischemia.  Appears similar to a prior ECG.  Less likely ACS or underlying arrhythmia as a cause to her symptoms.  Possible GI pathology.  Possible musculoskeletal pathology as well.  Will plan on lab work, imaging, cardiology consult.

## 2024-10-28 NOTE — ED PROVIDER NOTE - CARE PROVIDER_API CALL
Arron Donovan  Cardiovascular Disease  [address] [place] [address]  Phone: [phone]  Fax: [phone]  Follow Up Time: 1-3 Days

## 2024-10-28 NOTE — ED ADULT NURSE NOTE - OBJECTIVE STATEMENT
Reports worsening mid sternal chest pain at night with lying down only. Pain has been happening every night since this past Thursday. Pain lasts 2 hours. Pt states when she sits up and ambulates pain resolves. Pt is mid sternal and radiates to b/l out chest, b/l shoulder, and mid back. Intermittent nausea and diaphoresis  associated. Describes pain as "squeezing". Reports difficulty taking a deep breath when pain persists. Denies any pain now or difficulty breathing now. Hx afib, rate ranging from 60s-80s. Ambulates with walker, denies falls. Lives home alone with children "close by" who assist with care when needed. Daughter at bedside now.

## 2024-10-28 NOTE — CONSULT NOTE ADULT - SUBJECTIVE AND OBJECTIVE BOX
[facility] - Cuba Gallo, Bridger Martinez, Curt, Bari Rudd  Office Number: [phone]    Initial Consult Note    CHIEF COMPLAINT: Patient is a 89y old  Female who presents with a chief complaint of chest pain     HPI:    89 year old female with past medical history of afib, hypothyroidism, presenting with chest pain.     PAST MEDICAL & SURGICAL HISTORY:  Atrial fibrillation      Hypothyroid      Status post hysterectomy  1972      History of arthroscopy  left knee      History of colonoscopy          SOCIAL HISTORY:  No tobacco, ethanol, or drug abuse.    FAMILY HISTORY:    No family history of acute MI or sudden cardiac death.    MEDICATIONS  (STANDING):    MEDICATIONS  (PRN):      Allergies    sulfa drugs (Seizure)  iodine (Other)  Topamax (Other)  Depakote (Hepatotoxicity)    Intolerances        REVIEW OF SYSTEMS:  All other review of systems is negative unless indicated above    VITAL SIGNS:   Vital Signs Last 24 Hrs  T(C): 36.7 (28 Oct 2024 11:46), Max: 36.7 (28 Oct 2024 11:46)  T(F): 98 (28 Oct 2024 11:46), Max: 98 (28 Oct 2024 11:46)  HR: 65 (28 Oct 2024 12:27) (65 - 70)  BP: 145/76 (28 Oct 2024 12:27) (131/76 - 150/78)  BP(mean): --  RR: 18 (28 Oct 2024 12:27) (18 - 18)  SpO2: 97% (28 Oct 2024 12:27) (95% - 97%)    Parameters below as of 28 Oct 2024 12:27  Patient On (Oxygen Delivery Method): room air        I&O's Summary      On Exam:    Constitutional: NAD, alert and oriented x 3  Lungs:  Non-labored, breath sounds are clear bilaterally, No wheezing, rales or rhonchi  Cardiovascular: RRR.  S1 and S2 positive.  No murmurs, rubs, gallops or clicks  Gastrointestinal: Bowel Sounds present, soft, nontender.   Lymph: No peripheral edema. No cervical lymphadenopathy.  Neurological: Alert, no focal deficits  Skin: No rashes or ulcers   Psych:  Mood & affect appropriate.    LABS: All Labs Reviewed:                Blood Culture:         RADIOLOGY:    EKG:           [facility] - Cuba Gallo, Michelle, Bridger, Curt, Bari Rudd  Office Number: [phone]    Initial Consult Note    CHIEF COMPLAINT: Patient is a 89y old  Female who presents with a chief complaint of chest pain     HPI:    89 year old female with past medical history of afib on coumadin, CVA, hypothyroidism, polycythemia vera states platelets have been over 500 recently now presenting with chest pain.   Reports four day history of chest pain that wakes her up at night about hour and half after falling asleep. Chest pain is accompanied by occasional nausea , and feeling her heart beat in her head. Pain resolves without any intervention and is worse when taking a deep breath. Also last pm with right arm pain.  No recent illness, no v/d/ fever or chills. Reports active lifestyle, lives alone, does wash weekly and cleans house with no exertional complaints. Follows with Dr Galeano last seen last month. Has been compliant with her coumadin INR has been > 1.9       PAST MEDICAL & SURGICAL HISTORY:  Atrial fibrillation      Hypothyroid      Status post hysterectomy  1972      History of arthroscopy  left knee      History of colonoscopy          SOCIAL HISTORY:  No tobacco, ethanol, or drug abuse.    FAMILY HISTORY:    No family history of acute MI or sudden cardiac death.    MEDICATIONS  (STANDING):    MEDICATIONS  (PRN):      Allergies    sulfa drugs (Seizure)  iodine (Other)  Topamax (Other)  Depakote (Hepatotoxicity)    Intolerances        REVIEW OF SYSTEMS:  All other review of systems is negative unless indicated above    VITAL SIGNS:   Vital Signs Last 24 Hrs  T(C): 36.7 (28 Oct 2024 11:46), Max: 36.7 (28 Oct 2024 11:46)  T(F): 98 (28 Oct 2024 11:46), Max: 98 (28 Oct 2024 11:46)  HR: 65 (28 Oct 2024 12:27) (65 - 70)  BP: 145/76 (28 Oct 2024 12:27) (131/76 - 150/78)  BP(mean): --  RR: 18 (28 Oct 2024 12:27) (18 - 18)  SpO2: 97% (28 Oct 2024 12:27) (95% - 97%)    Parameters below as of 28 Oct 2024 12:27  Patient On (Oxygen Delivery Method): room air        I&O's Summary      On Exam:    Constitutional: NAD, alert and oriented x 3  Lungs:  Non-labored, breath sounds are clear bilaterally, No wheezing, rales or rhonchi  Cardiovascular: IRRR.  S1 and S2 positive.  No murmurs, rubs, gallops or clicks  Gastrointestinal: Bowel Sounds present, soft, nontender.   Lymph: No peripheral edema. No cervical lymphadenopathy.  Neurological: Alert, no focal deficits  Skin: No rashes or ulcers   Psych:  Mood & affect appropriate.    LABS: All Labs Reviewed:                Blood Culture:         RADIOLOGY:    EKG:

## 2024-10-28 NOTE — ED ADULT TRIAGE NOTE - CHIEF COMPLAINT QUOTE
Chest discomfort that started on Thursday and ONLY happens at night time after she lays down. Now worsening when she takes a deep breath.

## 2024-10-28 NOTE — ED ADULT TRIAGE NOTE - GLASGOW COMA SCALE: SCORE, MLM
Emergency Department Provider Note       PCP: Not, On File (Inactive)   Age: 39 y.o.   Sex: male     DISPOSITION Decision To Discharge 07/12/2024 10:46:32 PM       ICD-10-CM    1. Abscess  L02.91           Medical Decision Making     39-year-old male presents with complaint of small abscess noted to right upper back this been present for the past 3 days.  Patient tolerated I&D of around 1 cm abscess.  Minimal purulent drainage noted.  Patient with no history of MRSA.  Will discharge home with doxycycline.  Bacitracin applied to wound and bandage placed.  Will additionally discharge with bacitracin.  Patient structured to keep site clean and to follow-up with primary care physician for recheck.  Patient given return precautions.       1 acute, uncomplicated illness or injury.  Prescription drug management performed.  Shared medical decision making was utilized in creating the patients health plan today.    I independently ordered and reviewed each unique test.  I reviewed external records: ED visit note from an outside group.                   History     39-year-old male presents with complaint of abscess to right upper back region has been present for the past 3 to 4 days.  States that site is become more painful.  Denies any drainage from site.  Denies any bleeding from site.  Denies any recent bites.  Denies fever, chills, chest pain, shortness of breath.  Denies history of IV drug use.  Denies history of MRSA.    The history is provided by the patient. No  was used.       ROS     Review of Systems   Constitutional:  Negative for chills and fever.   HENT:  Negative for mouth sores.    Respiratory:  Negative for shortness of breath.    Cardiovascular:  Negative for chest pain.   Gastrointestinal:  Negative for abdominal pain, nausea and vomiting.   Genitourinary: Negative.    Musculoskeletal:  Negative for myalgias.   Skin:  Positive for rash.   Neurological:  Negative for weakness and 
15

## 2024-10-28 NOTE — ED ADULT NURSE NOTE - NSFALLRISK_ED_ALL_ED
Valerie Arizmendi, a 40 y.o. female presents to the ED w/ complaint of seizures. Pt  reports this is the second one today. Pt first ever seizure was about a year ago,  reports no hx prior. Pt convulsing and secretions coming out of mouth on arrival to room     Triage note:  Chief Complaint   Patient presents with    Seizures     Pt actively seizing upon arrival. Family with patient states this is the second seizure today.      Review of patient's allergies indicates:  No Known Allergies  Past Medical History:   Diagnosis Date    Abnormal Pap smear of vagina     colpo bx- cervixitis    Attention deficit disorder       Yes

## 2024-10-28 NOTE — ED PROVIDER NOTE - NSFOLLOWUPINSTRUCTIONS_ED_ALL_ED_FT
Follow up with your cardiologist. Return for fever, vomiting, increased chest pain, shortness of breath, worsening condition.       Chest Pain    Chest pain can be caused by many different conditions which may or may not be dangerous. Causes include heartburn, lung infections, heart attack, blood clot in lungs, skin infections, strain or damage to muscle, cartilage, or bones, etc. In addition to a history and physical examination, an electrocardiogram (ECG) or other lab tests may have been performed to determine the cause of your chest pain. Follow up with your primary care provider or with a cardiologist as instructed.     SEEK IMMEDIATE MEDICAL CARE IF YOU HAVE ANY OF THE FOLLOWING SYMPTOMS: worsening chest pain, coughing up blood, unexplained back/neck/jaw pain, severe abdominal pain, dizziness or lightheadedness, fainting, shortness of breath, sweaty or clammy skin, vomiting, or racing heart beat. These symptoms may represent a serious problem that is an emergency. Do not wait to see if the symptoms will go away. Get medical help right away. Call 911 and do not drive yourself to the hospital.

## 2024-10-28 NOTE — ED ADULT NURSE NOTE - NSFALLHARMRISKINTERV_ED_ALL_ED

## 2024-10-28 NOTE — ED PROVIDER NOTE - OBJECTIVE STATEMENT
89-year-old female with past medical history of A-fib on Coumadin presents today due to difficulty breathing and chest pain.  Patient reports that over the last 4 nights she has been waking up with chest pain which is worsened with deep breathing.  Patient reports that it would last about an hour and go away.  Patient does admit to some nausea and clamminess with the pain.  Patient reports that she follows with Dr. Moreno in which she was seen earlier in the month without concerning findings.  Patient denies hemoptysis, leg swelling, fever, vomiting, pain currently, or any other complaints.

## 2024-10-28 NOTE — ED PROVIDER NOTE - PROGRESS NOTE DETAILS
Cards advised may DC if second set negative.  Discussed results with the patient and provided copies.  All questions were answered. Discussed the importance of prompt, close medical follow-up. Patient will return with any changes, concerns or persistent/worsening symptoms.  Patient verbalized understanding.

## 2024-10-28 NOTE — ED ADULT TRIAGE NOTE - HEIGHT IN FEET
OUTPATIENT PROGRESS NOTE  TRANSITIONAL CARE MANAGEMENT - HOSPITAL DISCHARGE FOLLOW-UP    CHIEF COMPLAINT  Transitional Care Management    Duy Kapoor is unaccompanied    SUBJECTIVE   Duy Kapoor was hospitalized from 3/8/21 - 3/11/21 for neutropenic fever, pancytopenia 2/2 chemotherapy. Treated with broad-spectrum abx,  which were transitioned to cefdinir after neg blood cx x2.     Spoke with Nurse Navigator  on 3/12 - referral to behavioral health to discuss everything that he's been through.    Please see discharge summary for hospital course.  Since discharge, Duy has been feeling better since discharge. Felt much better on antibiotics. Has pretty severe proctitis and tenesmus. Would like behavioral health referral. Using ibuprofen 1-2 x daily.     Follow up instructions/changes in management:  - complete 7 day course of cefdinir    Pertinent un-finalized hospital performed diagnostic tests - not applicable..    Advanced Directives:  Not on file-copy was provided today in office    Durable Medical Equipment/Assistive devices prescribed: None     MEDICATIONS  The discharge medication list was reviewed. Outpatient medications were updated today. They are fully adherent to medication regimen. Barriers to this include: none    HISTORIES  I have personally reviewed and updated the following electronic medical record sections: Allergies, Problem List, Past Medical History, Past Surgical History, Social History and Family History.    REVIEW OF SYSTEMS  The following systems reviewed and negative apart from what was mentioned in the HPI:  Gi, constitutional,     PHYSICAL EXAM  Visit Vitals  /82   Temp 98.2 °F (36.8 °C)   Wt 86.3 kg   BMI 26.54 kg/m²      General: NAD, pleasant and interactive  Skin: warm and dry, no rash, no nodules   Eyes: sclera noninjected, pupils of normal size and are symmetric  HENT:  external ears normal without lesions  Neck: no asymmetry noted, trachea midline  Resp:  normal respiratory effort   Msk: normal gait and station, no cyanosis or edema of lower extremities b/l, moves upper and lower extremities spontaneously with grossly intact ROM  Neuro: CN 2-12 grossly intact, no focal deficits  Psych: mood and affect appropriate, memory intact     ASSESSMENT/PLAN  Duy was seen today for transitional care management.    Diagnoses and all orders for this visit:    Hospital discharge follow-up  Other social stressor  Situational depression  Neutropenic fever (CMS/HCC)  Symptomatically improved at this time from time of hospitalization and acute illness.  Has follow-up scheduled with Radiation-Oncology and Oncology. requests Behavioral Health referral up process although he has been through in the last year.  This was placed and list of counselors was provided in [facility], in addition to the [facility] intake line.  -     SERVICE TO BEHAVIORAL HEALTH      Health Maintenance:  · STD Screening: declines  · Immunizations: up to date.   · HIV screening ages 15-65: HIV positive - follows with ID  · Advance Directive: not on file. Information provided.  · ASCVD risk - elevated total chol, LDL and tg - plan to discuss at 2021 physical.    Patient adherence to their treatment plan was assessed. They are adherent to recommendations.     Follow up annually in Dec 2021.     Delia Nash, DO   Family Medicine  [facility]  3/16/2021       5

## 2024-10-28 NOTE — ED PROVIDER NOTE - PATIENT PORTAL LINK FT
You can access the FollowMyHealth Patient Portal offered by [facility] by registering at the following website: [email or web address]. By joining [facility]’s FollowMyHealth portal, you will also be able to view your health information using other applications (apps) compatible with our system.
Discharge instructions given by Haydee ASHTON

## 2024-11-04 NOTE — ED PROVIDER NOTE - NS ED MD DISPO DISCHARGE CCDA
Hub staff attempted to follow warm transfer process and was unsuccessful     Caller: Andrea Odom    Relationship to patient: Self    Best call back number: [phone]    Patient is needing: PT HAS QUESTIONS ABOUT UPCOMING SURGERY WITH DR. SINHA. PLEASE REACH BACK WHEN AVAILABLE       Patient/Caregiver provided printed discharge information.

## 2024-11-19 NOTE — CARE COORDINATION ASSESSMENT. - HOME EQUIPMENT YN
What Type Of Note Output Would You Prefer (Optional)?: Standard Output How Severe Is Your Skin Lesion?: moderate Has Your Skin Lesion Been Treated?: not been treated Is This A New Presentation, Or A Follow-Up?: Skin Lesions Additional History: Patient has a pre cancerous condition of the vulva. September, patient had surgery to remove cancer cells. Patient is seeing a specialist in February, sebaceous hyperplasia. \\n\\nSince September surgery, patient keeps getting boils, growing and burning. Oncologist had suspected HSV. HSV is negative. Patient states boils are painful. She has only tried warm baths. \\n\\nHad went to the ER (Nov 7th) during a big flare. \\n\\nPatient had received antibiotics - cephalexin.\\n\\nPatient noticed boils in her groin area about 2 years ago after starting treatment for vulvar precancerous lesion.  She is following by a gyn oncologist on a regular basis. Yes

## 2025-04-19 ENCOUNTER — EMERGENCY (EMERGENCY)
Facility: HOSPITAL | Age: 89
LOS: 1 days | End: 2025-04-19
Attending: EMERGENCY MEDICINE | Admitting: EMERGENCY MEDICINE
Payer: MEDICARE

## 2025-04-19 VITALS
TEMPERATURE: 98 F | WEIGHT: 179.9 LBS | RESPIRATION RATE: 16 BRPM | HEART RATE: 73 BPM | OXYGEN SATURATION: 98 % | SYSTOLIC BLOOD PRESSURE: 145 MMHG | HEIGHT: 63 IN | DIASTOLIC BLOOD PRESSURE: 83 MMHG

## 2025-04-19 DIAGNOSIS — Z90.710 ACQUIRED ABSENCE OF BOTH CERVIX AND UTERUS: Chronic | ICD-10-CM

## 2025-04-19 DIAGNOSIS — Z98.890 OTHER SPECIFIED POSTPROCEDURAL STATES: Chronic | ICD-10-CM

## 2025-04-19 LAB
ALBUMIN SERPL ELPH-MCNC: 3.6 G/DL — SIGNIFICANT CHANGE UP (ref 3.3–5)
ALP SERPL-CCNC: 86 U/L — SIGNIFICANT CHANGE UP (ref 40–120)
ALT FLD-CCNC: 32 U/L — SIGNIFICANT CHANGE UP (ref 12–78)
ANION GAP SERPL CALC-SCNC: 9 MMOL/L — SIGNIFICANT CHANGE UP (ref 5–17)
APTT BLD: 49.1 SEC — HIGH (ref 24.5–35.6)
AST SERPL-CCNC: 31 U/L — SIGNIFICANT CHANGE UP (ref 15–37)
BASOPHILS # BLD AUTO: 0.15 K/UL — SIGNIFICANT CHANGE UP (ref 0–0.2)
BASOPHILS NFR BLD AUTO: 1.1 % — SIGNIFICANT CHANGE UP (ref 0–2)
BILIRUB SERPL-MCNC: 1.1 MG/DL — SIGNIFICANT CHANGE UP (ref 0.2–1.2)
BUN SERPL-MCNC: 19 MG/DL — SIGNIFICANT CHANGE UP (ref 7–23)
CALCIUM SERPL-MCNC: 9.5 MG/DL — SIGNIFICANT CHANGE UP (ref 8.5–10.1)
CHLORIDE SERPL-SCNC: 103 MMOL/L — SIGNIFICANT CHANGE UP (ref 96–108)
CO2 SERPL-SCNC: 23 MMOL/L — SIGNIFICANT CHANGE UP (ref 22–31)
CREAT SERPL-MCNC: 0.8 MG/DL — SIGNIFICANT CHANGE UP (ref 0.5–1.3)
EGFR: 70 ML/MIN/1.73M2 — SIGNIFICANT CHANGE UP
EGFR: 70 ML/MIN/1.73M2 — SIGNIFICANT CHANGE UP
EOSINOPHIL # BLD AUTO: 0.44 K/UL — SIGNIFICANT CHANGE UP (ref 0–0.5)
EOSINOPHIL NFR BLD AUTO: 3.3 % — SIGNIFICANT CHANGE UP (ref 0–6)
GLUCOSE SERPL-MCNC: 137 MG/DL — HIGH (ref 70–99)
HCT VFR BLD CALC: 52.6 % — HIGH (ref 34.5–45)
HGB BLD-MCNC: 18.1 G/DL — HIGH (ref 11.5–15.5)
IMM GRANULOCYTES NFR BLD AUTO: 0.4 % — SIGNIFICANT CHANGE UP (ref 0–0.9)
INR BLD: 2.61 RATIO — HIGH (ref 0.85–1.16)
LYMPHOCYTES # BLD AUTO: 1.29 K/UL — SIGNIFICANT CHANGE UP (ref 1–3.3)
LYMPHOCYTES # BLD AUTO: 9.8 % — LOW (ref 13–44)
MCHC RBC-ENTMCNC: 28.3 PG — SIGNIFICANT CHANGE UP (ref 27–34)
MCHC RBC-ENTMCNC: 34.4 G/DL — SIGNIFICANT CHANGE UP (ref 32–36)
MCV RBC AUTO: 82.2 FL — SIGNIFICANT CHANGE UP (ref 80–100)
MONOCYTES # BLD AUTO: 0.93 K/UL — HIGH (ref 0–0.9)
MONOCYTES NFR BLD AUTO: 7.1 % — SIGNIFICANT CHANGE UP (ref 2–14)
NEUTROPHILS # BLD AUTO: 10.29 K/UL — HIGH (ref 1.8–7.4)
NEUTROPHILS NFR BLD AUTO: 78.3 % — HIGH (ref 43–77)
NRBC BLD AUTO-RTO: 0 /100 WBCS — SIGNIFICANT CHANGE UP (ref 0–0)
PLATELET # BLD AUTO: 520 K/UL — HIGH (ref 150–400)
POTASSIUM SERPL-MCNC: 4.2 MMOL/L — SIGNIFICANT CHANGE UP (ref 3.5–5.3)
POTASSIUM SERPL-SCNC: 4.2 MMOL/L — SIGNIFICANT CHANGE UP (ref 3.5–5.3)
PROT SERPL-MCNC: 6.8 G/DL — SIGNIFICANT CHANGE UP (ref 6–8.3)
PROTHROM AB SERPL-ACNC: 30.5 SEC — HIGH (ref 9.9–13.4)
RBC # BLD: 6.4 M/UL — HIGH (ref 3.8–5.2)
RBC # FLD: 17.5 % — HIGH (ref 10.3–14.5)
SODIUM SERPL-SCNC: 135 MMOL/L — SIGNIFICANT CHANGE UP (ref 135–145)
WBC # BLD: 13.15 K/UL — HIGH (ref 3.8–10.5)
WBC # FLD AUTO: 13.15 K/UL — HIGH (ref 3.8–10.5)

## 2025-04-19 PROCEDURE — 73030 X-RAY EXAM OF SHOULDER: CPT | Mod: 26,RT

## 2025-04-19 PROCEDURE — 70450 CT HEAD/BRAIN W/O DYE: CPT | Mod: 26

## 2025-04-19 PROCEDURE — 72125 CT NECK SPINE W/O DYE: CPT | Mod: 26

## 2025-04-19 PROCEDURE — 99285 EMERGENCY DEPT VISIT HI MDM: CPT

## 2025-04-19 RX ORDER — HYDROMORPHONE/SOD CHLOR,ISO/PF 2 MG/10 ML
0.5 SYRINGE (ML) INJECTION ONCE
Refills: 0 | Status: DISCONTINUED | OUTPATIENT
Start: 2025-04-19 | End: 2025-04-19

## 2025-04-19 RX ORDER — ACETAMINOPHEN 500 MG/5ML
1000 LIQUID (ML) ORAL ONCE
Refills: 0 | Status: COMPLETED | OUTPATIENT
Start: 2025-04-19 | End: 2025-04-19

## 2025-04-19 RX ORDER — OXYCODONE HYDROCHLORIDE 30 MG/1
1 TABLET ORAL
Qty: 15 | Refills: 0
Start: 2025-04-19

## 2025-04-19 RX ADMIN — Medication 0.5 MILLIGRAM(S): at 22:08

## 2025-04-19 RX ADMIN — Medication 400 MILLIGRAM(S): at 22:04

## 2025-04-19 NOTE — ED PROVIDER NOTE - WR ORDER DATE AND TIME 1
I performed a history and physical exam of the patient and discussed their management with the resident. I reviewed the resident's note and agree with the documented findings and plan of care. I have edited as appropriate. My medical decision making and observations are found above.  pt p/w small abrasion on r labia majora, chapareoned by dr franco. no active bleeding, clear for d/c with abx ointment and instructed to not use scissors near vagina area. no signs of infection 19-Apr-2025 21:20

## 2025-04-19 NOTE — ED PROVIDER NOTE - CLINICAL SUMMARY MEDICAL DECISION MAKING FREE TEXT BOX
89 F hx recent CVA (on plavix), afib on coumadin, HTN, arthritis, c/o right shoulder pain s/p fall. Pt lives at home. Ambulates without assistance in home. Uses rollator when she goes out. Right hand dominant. Was closing a window, lost her balance, tried to catch herself or make it to couch but fell and landed backwards. Denies hitting her head or LOC. Unable to get up and life alert didn't work but managed to get herself over to a phone to call for ambulance and was in the ED within 1 hour of fall.    VS noted.  Appears uncomfortable  NCAT  No C-spine tenderness, FROM  No hip/pelvic tenderness  RUE: tenderness over the shoulder joint, pain exacerbated with slightest movement. Normal cap refill. sensation/motor intact to the R hand.  Will eval for fracture/dislocation, and head injury  Will get CTH and C-spine, R shoulder/humerus XRs  Will give pain management, arm sling. Will reassess.

## 2025-04-19 NOTE — ED PROVIDER NOTE - PROGRESS NOTE DETAILS
CTH and C-spine neg for traumatic injury  Daughter at the bedside, results of XR and CTs d/w pt and daughter.  Advised f/u with ortho on Monday  Daughter will help pt in the house with ADLs.  Pt reports she does not use assistive devices to ambulate in the house, only when she goes outside. Daughter states she will accompany pt when she has to be outdoors Daughter feels comfortable taking pt home and is able to assist her.   Offered PT and rehab eval in AM, they feel ok about going home. Will prescribe oxycodone for pain management. Pt advised to take Tylenol along with oxycodone for pain management. CTH and C-spine neg for traumatic injury  Xr shows humeral head frcture. D/w PA camryn Ying, recommends sling and outpt f/u  Daughter at the bedside, results of XR and CTs d/w pt and daughter.  Advised f/u with ortho on Monday  Daughter will help pt in the house with ADLs.  Pt reports she does not use assistive devices to ambulate in the house, only when she goes outside. Daughter states she will accompany pt when she has to be outdoors Daughter feels comfortable taking pt home and is able to assist her.   Offered PT and rehab eval in AM, they feel ok about going home. Will prescribe oxycodone for pain management. Pt advised to take Tylenol along with oxycodone for pain management.

## 2025-04-19 NOTE — ED PROVIDER NOTE - OBJECTIVE STATEMENT
89 F hx recent CVA (on plavix), afib on coumadin, HTN, arthritis, c/o right shoulder pain s/p fall. Pt lives at home. Ambulates without assistance in home. Uses rollator when she goes out. Right hand dominant. Was closing a window, lost her balance, tried to catch herself or make it to couch but fell and landed backwards. Denies hitting her head or LOC. Unable to get up and life alert didn't work but managed to get herself over to a phone to call for ambulance and was in the ED within 1 hour of fall.

## 2025-04-19 NOTE — ED PROVIDER NOTE - MUSCULOSKELETAL, MLM
No chest wall or rib tenderness. FROM LUE, BLE. No midline tenderness. RUE: swelling and tenderness by right shoulder, unable to well examine due to intense pain with movement , nontender elbow/forearm/wrist/hand/fingers, +NVI.

## 2025-04-19 NOTE — ED PROVIDER NOTE - NSFOLLOWUPINSTRUCTIONS_ED_ALL_ED_FT
Follow up with Orthopedics on Monday  You may take oxycodone and Tylenol 650mg every 4 hrs as needed for pain control

## 2025-04-19 NOTE — ED ADULT TRIAGE NOTE - HEIGHT IN INCHES
Date of Injury: 01/2021  Initial Treatment Date: 09/16/21  Date Last Seen: 10/14/21  Mechanism of Onset: Other on  Occupation: None  Referring by: Lm Lynn DC  Primary Care Physician: Bryanna Simms MD  Date informed consent signed:  09/16/21  ABN: N/A  I have reviewed the past medical history, family history, social history, medications and allergies listed in the medical record as obtained by my nursing staff and support staff and agree with their documentation.  Nika  reports that she quit smoking about 9 years ago. Her smoking use included cigarettes. She has a 10.00 pack-year smoking history. She has never used smokeless tobacco.    Nika is allergic to benadryl [altaryl], betadine antibiotic-moisturize [bacitracin-polymyxin b], phenobarbital, phisohex [hexachlorophene], adhesive   (environmental), lyrica, and silk floss.   Advance Directive Status: No  Visit Number of Current Episode: 5        Subjective: Nika is a 64 year old female who comes in today for evaluation and treatment of her neck, back and hips.  Patient patient complains of lower back pain and pain in the front of the right hip.  She relates that she has mild ache in the neck with tightness.  Patient rates her pain today at 4/10 with 10 being worst.      Initial history:  Patient relates she is here today for pain that she has been experiencing in the right hip that began a couple of years ago and started in frequently and gradually happen more often over time.  Patient relates that in December of 2020 or January of 2021 she reports that she started to get pain in the waist and then it spread up into the low back and down into the right gluteal area from there.  She describes her pain as sharp in nature.  Patient describes pain with coughing and sneezing but denies any pain with straining to make a bowel movement.  She reports no loss of bowel or bladder control or any ominous signs.  She does report that she does get chills  sometimes.Patient relates that used to help when she home her leg off the bed but that does not seem to be helping anymore.  Patient relates that in February of 2019 she had breast cancer and underwent radiation and she relates while performing PT she tore her rotator.  Patient relates that with the drugs that she was taking she felt she was at up.  She relates that she gained weight she relates over the last month she feels like she is starting to get back her normal routine and she is walking more and feels more alert and is doing more activities of daily living.  Patient relates that she is waiting for the results for thyroid cancer that she believes is from radiation.  Patient rates her pain today at 5/10 with 10 being the worst.    Medications currently taking:   Baby aspirin, Blood pressure medications.      Objective:    The patient is a 64 year old female who is pleasant, coherent x3, ambulatory with a cane and does not appear to be in visible distress.  C2 was body right with tenderness over the right transverse process.  Bilateral trapezius muscles are taut and tender to palpate patient.  Restriction at T6.  Tenderness with restriction at L4 and L5.  Right multifidus and piriformis muscles are taut and bilateral sacroiliac joint and gluteal muscles are tender to palpation.  Left ilium is anterior and superior.  Right ilium posterior inferior.    Updated orthopedic/neurological testing on the last visit.  Positive orthopedic tests:  Left lateral bending foraminal compression eliciting the tingle on the left side of the neck and a pull in the right side of the neck.  Negative orthopedic tests right lateral bending and neutral foraminal compression.  Foote halls.  Bilateral shoulder depressor.  Muscle testing and deep tendon reflexes in the upper extremities are within normal limits.    Orthopedic/Neurologic testing on the initial visit:  Muscle testing in the lower extremity shows +3 right leg extension  secondary to previous surgery to remove her kneecaps.  Remainder strength in the lower extremities is +5 bilaterally.  Deep tendon reflexes are +2 bilaterally in the lower extremities.  Positive orthopedic tests: Lencho Yoni's test parts 2 and 3 on the right.  Right Nachlas test eliciting pain on the outside of the right hip.  Negative orthopedic tests:  House.  Bilateral Isaac's.  Straight and well leg raise.  Bilateral Ely's, Wes, yeoman's as well as left Nachlas. Lencho Yoni's test part 1 on the right and parts 1 through 3 on the left.    Objective disability index score from the initial encounter for this episode is 67% using the functional rating index questionnaire.    Patient emotional screening, [facility]/[place] Pain Supplemental Questionnaire score was 32/40.      Relevant Diagnostic Imaging/Testin2021   EXAM:  XR HIP 2 VW RIGHT  IMPRESSION:  No acute fracture or malalignment. Osteoarthritis of the  sacroiliac joints, pubic symphysis and the right hip. MRI can be considered  for evaluation of occult injury or internal derangement, if necessary.    2020   EXAM: MRI SHOULDER JOINT WO CONTRAST RIGHT.   IMPRESSION:   1.  Tendinosis and partial-thickness tears of the supraspinatus status post  surgical anchoring, without definitive evidence of full-thickness defect.  2.  Mild tendinopathy and partial-thickness tears elsewhere the rotator  cuff.  3.  Status post distal clavicle resection, with no significant mass effect  upon the supraspinatus.  4.  Retracted long head biceps tendon status post tenotomy.  5.  Subacromial/subdeltoid bursal effusion or bursitis.  6.  Questionable labral tears and fraying, not well visualized on this  nonarthrographic examination    10/11/2011   DX Hip 2 View Min LEFT   FINDINGS:   Frontal and frog leg lateral view left hip demonstrates preserved joint space   and no evidence of acute fracture or dislocation.    Impression  IMPRESSION:   Unremarkable  exam.        Assessment:  Patient complains of right-sided anterior hip pain.  Low back pain.  Mild ache and tightness in the neck and upper back. Segmental dysfunction in the cervical, thoracic, lumbar, pelvic regions.  Patient has previously complained of pain in the right hip that I believe has trochanteric tendinitis.  Restricted range of motion of the right hip.  Right-sided lower back pain.  Right-sided gluteal pain.  Radiology report as listed above in the radiology section details “osteoarthritis of the  sacroiliac joints, pubic symphysis and the right hip. ”  1. Chronic right-sided low back pain without sciatica    2. Chronic right hip pain    3. Cervicalgia    4. Neck tightness    5. Segmental and somatic dysfunction of cervical region    6. Segmental and somatic dysfunction of thoracic region    7. Segmental and somatic dysfunction of lumbar region    8. Segmental and somatic dysfunction of pelvic region        Complicating Factors/Comorbidities:  Increased BMI.  Previous knee surgeries causing altered gait.  Previous cancer.    Plan:  Risks and benefits to chiropractic manipulative therapy have been reviewed with this patient and she would like to proceed with treatment.  Patient was evaluated and treatment with Tubbs flexion distraction to decrease disc compression and promote imbibition and reduce fixation at the level of C2, L4 and L5.  Axial distraction is applied at T6.  Stephens drop technique is applied to the pelvis bilaterally.  Treatment was well tolerated.    Therapeutic Exercise/Rehab/Modalities performed today:  Cross friction soft tissue work is applied to the bilateral trapezius musculature as well as the right multifidus and piriformis muscles to reduce muscle tension.    Patient Instruction/Education:  Cryotherapy may be utilized 15 minutes/hours needed to reduce pain and swelling.  Abdominal bracing to provide core stabilization will be instructed on subsequent visits.  Hip hinging may be  considered after I receive radiology findings of the hip. Patient stated understanding of, and was in agreement with, the discussed instructions.    Goals of Care: Goal of care is to improve muscular and skeletal function and provide symptom relief.  Short-term goals are 50% reduction in symptoms over the next 4 weeks.    Other treatment options discussed with patient were not discussed at this time but may be considered in the future as needed.      Patient rates her pain post treatment in the lower back at a 0 in the right hip that is 0-1/10 with 10 being worst.    Patient is scheduled to return on 10/22/2021 for continued care and treatment of her condition consistent with plan of care.    Length of Visit:  16 minutes.           3

## 2025-04-19 NOTE — ED PROVIDER NOTE - PATIENT PORTAL LINK FT
You can access the FollowMyHealth Patient Portal offered by Health system by registering at the following website: [email or web address]. By joining [employer]’s FollowMyHealth portal, you will also be able to view your health information using other applications (apps) compatible with our system.

## 2025-04-19 NOTE — ED ADULT TRIAGE NOTE - MEANS OF ARRIVAL
Group Therapy Note    Date: 7/6/2020    Group Start Time: 1000  Group End Time: 1050  Group Topic: Psychoeducation    SYL TAYLOR    VIRGINIA Torres, JAYLON        Group Therapy Note    Attendees: 9         Patient's Goal:  Pt will demonstrate increased interpersonal interaction. Notes:  Group topic was boundaries.     Status After Intervention:  Improved    Participation Level: Interactive    Participation Quality: Appropriate, Attentive and Sharing      Speech:  normal      Thought Process/Content: Logical      Affective Functioning: Congruent      Mood: anxious      Level of consciousness:  Alert      Response to Learning: Progressing to goal      Endings: None Reported    Modes of Intervention: Education      Discipline Responsible: /Counselor      Signature:  VIRGINIA Torres, JAYLON stretcher

## 2025-04-19 NOTE — ED PROVIDER NOTE - WET READ LAUNCH FT
Called and spoke to patient. Patient scheduled for biopsy per request. Pt verbalized understanding.   ----- Message from Sheridan Blunt MD sent at 8/4/2022 11:38 AM CDT -----  Please call pt to schedule biopsy for lesion of the left thigh    
There are no Wet Read(s) to document.

## 2025-04-20 VITALS
HEART RATE: 78 BPM | SYSTOLIC BLOOD PRESSURE: 144 MMHG | DIASTOLIC BLOOD PRESSURE: 82 MMHG | TEMPERATURE: 98 F | OXYGEN SATURATION: 97 % | RESPIRATION RATE: 18 BRPM

## 2025-04-20 PROCEDURE — 73030 X-RAY EXAM OF SHOULDER: CPT

## 2025-04-20 PROCEDURE — 99284 EMERGENCY DEPT VISIT MOD MDM: CPT | Mod: 25

## 2025-04-20 PROCEDURE — 96376 TX/PRO/DX INJ SAME DRUG ADON: CPT

## 2025-04-20 PROCEDURE — 80053 COMPREHEN METABOLIC PANEL: CPT

## 2025-04-20 PROCEDURE — 85610 PROTHROMBIN TIME: CPT

## 2025-04-20 PROCEDURE — 70450 CT HEAD/BRAIN W/O DYE: CPT | Mod: MC

## 2025-04-20 PROCEDURE — 85730 THROMBOPLASTIN TIME PARTIAL: CPT

## 2025-04-20 PROCEDURE — 85025 COMPLETE CBC W/AUTO DIFF WBC: CPT

## 2025-04-20 PROCEDURE — 72125 CT NECK SPINE W/O DYE: CPT | Mod: MC

## 2025-04-20 PROCEDURE — 36415 COLL VENOUS BLD VENIPUNCTURE: CPT

## 2025-04-20 PROCEDURE — 96375 TX/PRO/DX INJ NEW DRUG ADDON: CPT

## 2025-04-20 PROCEDURE — 96374 THER/PROPH/DIAG INJ IV PUSH: CPT

## 2025-04-20 RX ORDER — HYDROMORPHONE/SOD CHLOR,ISO/PF 2 MG/10 ML
0.5 SYRINGE (ML) INJECTION ONCE
Refills: 0 | Status: DISCONTINUED | OUTPATIENT
Start: 2025-04-20 | End: 2025-04-20

## 2025-04-20 RX ADMIN — Medication 0.5 MILLIGRAM(S): at 00:13

## 2025-04-20 RX ADMIN — Medication 0.5 MILLIGRAM(S): at 00:20

## 2025-04-20 NOTE — ED ADULT NURSE NOTE - OBJECTIVE STATEMENT
Received patient alert and orientated times 4 with c/o right shoulder pain s/p fall.. Denied head strike, NO loc.  on Plavix..    Positive radial pulse  skin is warm and dry,  Reports pain 10/10  medicated as directed   Denied chest pain, SOB  cough  respirations even non labored   Daughter at the bedside

## 2025-04-20 NOTE — ED ADULT NURSE NOTE - CAS DISCH TRANSFER METHOD
Private car

## 2025-04-20 NOTE — ED ADULT NURSE NOTE - NSFALLRISKINTERV_ED_ALL_ED

## 2025-04-21 ENCOUNTER — APPOINTMENT (OUTPATIENT)
Dept: ORTHOPEDIC SURGERY | Facility: CLINIC | Age: 89
End: 2025-04-21
Payer: MEDICARE

## 2025-04-21 VITALS — HEIGHT: 66 IN | WEIGHT: 175 LBS | BODY MASS INDEX: 28.12 KG/M2

## 2025-04-21 DIAGNOSIS — M19.011 PRIMARY OSTEOARTHRITIS, RIGHT SHOULDER: ICD-10-CM

## 2025-04-21 DIAGNOSIS — M25.519 PAIN IN UNSPECIFIED SHOULDER: ICD-10-CM

## 2025-04-21 DIAGNOSIS — S42.291A OTHER DISPLACED FRACTURE OF UPPER END OF RIGHT HUMERUS, INITIAL ENCOUNTER FOR CLOSED FRACTURE: ICD-10-CM

## 2025-04-21 PROCEDURE — 73030 X-RAY EXAM OF SHOULDER: CPT | Mod: RT

## 2025-04-21 PROCEDURE — 99214 OFFICE O/P EST MOD 30 MIN: CPT

## 2025-04-21 PROCEDURE — 73010 X-RAY EXAM OF SHOULDER BLADE: CPT | Mod: RT

## 2025-04-21 RX ORDER — CLOPIDOGREL 75 MG/1
TABLET, FILM COATED ORAL
Refills: 0 | Status: ACTIVE | COMMUNITY

## 2025-04-21 RX ORDER — WARFARIN 4 MG/1
TABLET ORAL
Refills: 0 | Status: ACTIVE | COMMUNITY

## 2025-04-21 RX ORDER — AMLODIPINE BESYLATE 5 MG/1
TABLET ORAL
Refills: 0 | Status: ACTIVE | COMMUNITY

## 2025-04-21 RX ORDER — CARVEDILOL 3.12 MG/1
TABLET, FILM COATED ORAL
Refills: 0 | Status: ACTIVE | COMMUNITY

## 2025-04-21 RX ORDER — OXYCODONE 5 MG/1
5 TABLET ORAL
Qty: 32 | Refills: 0 | Status: ACTIVE | COMMUNITY
Start: 2025-04-21 | End: 1900-01-01

## 2025-04-21 RX ORDER — VALSARTAN 40 MG/1
TABLET, COATED ORAL
Refills: 0 | Status: ACTIVE | COMMUNITY

## 2025-04-28 ENCOUNTER — APPOINTMENT (OUTPATIENT)
Dept: ORTHOPEDIC SURGERY | Facility: CLINIC | Age: 89
End: 2025-04-28

## 2025-05-19 ENCOUNTER — APPOINTMENT (OUTPATIENT)
Dept: ORTHOPEDIC SURGERY | Facility: CLINIC | Age: 89
End: 2025-05-19
Payer: MEDICARE

## 2025-05-19 ENCOUNTER — NON-APPOINTMENT (OUTPATIENT)
Age: 89
End: 2025-05-19

## 2025-05-19 VITALS — BODY MASS INDEX: 28.12 KG/M2 | HEIGHT: 66 IN | WEIGHT: 175 LBS

## 2025-05-19 DIAGNOSIS — S42.291A OTHER DISPLACED FRACTURE OF UPPER END OF RIGHT HUMERUS, INITIAL ENCOUNTER FOR CLOSED FRACTURE: ICD-10-CM

## 2025-05-19 DIAGNOSIS — M19.011 PRIMARY OSTEOARTHRITIS, RIGHT SHOULDER: ICD-10-CM

## 2025-05-19 PROCEDURE — 99214 OFFICE O/P EST MOD 30 MIN: CPT

## 2025-05-19 PROCEDURE — 73030 X-RAY EXAM OF SHOULDER: CPT | Mod: RT

## 2025-06-16 ENCOUNTER — APPOINTMENT (OUTPATIENT)
Dept: ORTHOPEDIC SURGERY | Facility: CLINIC | Age: 89
End: 2025-06-16
Payer: MEDICARE

## 2025-06-16 VITALS — WEIGHT: 175 LBS | BODY MASS INDEX: 28.12 KG/M2 | HEIGHT: 66 IN

## 2025-06-16 PROCEDURE — 73030 X-RAY EXAM OF SHOULDER: CPT | Mod: RT

## 2025-06-16 PROCEDURE — 99214 OFFICE O/P EST MOD 30 MIN: CPT

## 2025-06-27 NOTE — ASU PATIENT PROFILE, ADULT - CAREGIVER NAME
Be Maloney

## 2025-07-24 NOTE — H&P ADULT - NSCORESITESY/N_GEN_A_CORE_RD
HPI    Pt is here for a 1 month DFE/OCT    Pt states that she can still see the bubble in OD from surgery. States   that she is noticing the flashes and floaters less and less. No eye pain   or discomfort. Vision in OD is still blurry. No other complaints.  Last edited by Zaid Alberto MA on 7/24/2025  1:15 PM.           A/P    ICD-10-CM ICD-9-CM   1. Macular hole, right  H35.341 362.54           1. Macular hole, right  Here for retina f/u    Pre-op Exam notable for stable mac hole OD with vitreous attachment     Postop: s/p PPV/MP/Afx/20% SF6 (Date 3/10/25 by Imani/Brisa) for mac hole od    Postop: s/p PPV/MP/Afx/14% C3F8 (Date 6/9/25 by Imani/Aure/Aman) for mac hole OD  Positioning: Face down  Duration: 2 days    7/24/2025 [facility] (Was 20/500), retina flat, hole closed with outer retina irreg, gas almost gone  Plan: Observation     Instructions reviewed   - RD precautions  - Return for increasing pain/decreasing vision     ----------    2. Vitreous degeneration of both eyes  No RT/RD but has mac hole attachment OD  Plan: Observation OS for now, counseled on risk future mac hole, plan as above OD    3. Age-related nuclear cataract of both eyes  Mod NS/CC  Plan: Observation for now    RTC 2 mo DFE/OCTm OU         I saw and examined the patient and reviewed in detail the findings documented. The final examination findings, image interpretations which have been independently interpreted, and plan as documented in the record represent my personal judgment and conclusions.    Victoriano Diallo MD  Vitreoretinal Surgery   [facility]    Yes

## 2025-08-11 ENCOUNTER — APPOINTMENT (OUTPATIENT)
Dept: ORTHOPEDIC SURGERY | Facility: CLINIC | Age: 89
End: 2025-08-11